# Patient Record
Sex: FEMALE | Race: WHITE | Employment: PART TIME
[De-identification: names, ages, dates, MRNs, and addresses within clinical notes are randomized per-mention and may not be internally consistent; named-entity substitution may affect disease eponyms.]

---
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
## 2018-01-08 ENCOUNTER — APPOINTMENT (OUTPATIENT)
Dept: GENERAL RADIOLOGY | Facility: CLINIC | Age: 18
End: 2018-01-08
Attending: NURSE PRACTITIONER
Payer: COMMERCIAL

## 2018-01-08 ENCOUNTER — HOSPITAL ENCOUNTER (EMERGENCY)
Facility: CLINIC | Age: 18
Discharge: HOME | End: 2018-01-08
Attending: NURSE PRACTITIONER | Admitting: NURSE PRACTITIONER
Payer: COMMERCIAL

## 2018-01-08 VITALS
BODY MASS INDEX: 17.49 KG/M2 | HEIGHT: 65 IN | TEMPERATURE: 98.5 F | SYSTOLIC BLOOD PRESSURE: 121 MMHG | WEIGHT: 105 LBS | DIASTOLIC BLOOD PRESSURE: 71 MMHG | RESPIRATION RATE: 12 BRPM | OXYGEN SATURATION: 100 %

## 2018-01-08 DIAGNOSIS — T14.8XXA MUSCLE STRAIN: ICD-10-CM

## 2018-01-08 DIAGNOSIS — M54.50 ACUTE MIDLINE LOW BACK PAIN WITHOUT SCIATICA: ICD-10-CM
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
## 2018-01-08 PROCEDURE — 72100 X-RAY EXAM L-S SPINE 2/3 VWS: CPT

## 2018-01-08 PROCEDURE — 25000132 ZZH RX MED GY IP 250 OP 250 PS 637: Performed by: NURSE PRACTITIONER

## 2018-01-08 PROCEDURE — 99283 EMERGENCY DEPT VISIT LOW MDM: CPT

## 2018-01-08 RX ORDER — CYCLOBENZAPRINE HCL 10 MG
10 TABLET ORAL ONCE
Status: COMPLETED | OUTPATIENT
Start: 2018-01-08 | End: 2018-01-08

## 2018-01-08 RX ORDER — CYCLOBENZAPRINE HCL 10 MG
10 TABLET ORAL 3 TIMES DAILY PRN
Qty: 21 TABLET | Refills: 0 | Status: SHIPPED | OUTPATIENT
Start: 2018-01-08 | End: 2018-07-27

## 2018-01-08 RX ORDER — ACETAMINOPHEN 500 MG
1000 TABLET ORAL ONCE
Status: COMPLETED | OUTPATIENT
Start: 2018-01-08 | End: 2018-01-08

## 2018-01-08 RX ADMIN — CYCLOBENZAPRINE HYDROCHLORIDE 10 MG: 10 TABLET, FILM COATED ORAL at 14:30

## 2018-01-08 RX ADMIN — ACETAMINOPHEN 1000 MG: 500 TABLET, FILM COATED ORAL at 14:30

## 2018-01-08 ASSESSMENT — ENCOUNTER SYMPTOMS
NUMBNESS: 0
WEAKNESS: 0
BACK PAIN: 1
SHORTNESS OF BREATH: 0

## 2018-01-08 NOTE — ED AVS SNAPSHOT
Emergency Department    [address] [facility] [address]    Phone:  [phone]    Fax:  [phone]                                       Danna Suarez   MRN: [id number]    Department:   Emergency Department   Date of Visit:  1/8/2018           Patient Information     Date Of Birth          2000        Your diagnoses for this visit were:     Fall due to slipping on ice or snow, initial encounter     Acute midline low back pain without sciatica     Muscle strain        You were seen by Izzy Davey APRN CNP.      Follow-up Information     Follow up with Luis A London MD In 5 days.    Specialty:  Internal Medicine    Contact information:    [address] [facility] [address]  [phone]          Discharge Instructions       Take tylenol 1,000 (2 over the counter extra strength Tylenol) mg every 6 hours as needed for pain. Take Flexeril (muscle relaxer) three times a day as needed for pain or muscle spasm, this can be especially helpful at bedtime.         Back Contusion  You have a contusion to your back. A contusion is also called a bruise. There is swelling and some bleeding under the skin. The skin may be purplish. You may have muscle aching and stiffness in the area of the bruise. There are no broken bones.  Contusions heal on their own, without further treatment. However, pain and skin discoloration may take weeks to months to go away.   Home care    Rest. Avoid heavy lifting, strenuous exertion, or any activity that causes pain.    Ice the area to reduce pain and swelling. Put ice cubes in a plastic bag or use a cold pack. (Wrap the cold source in a thin towel. Don't place it directly on your skin.) Ice the injured area for 20 minutes every 1 to 2 hours the first day. Continue with ice packs 3 to 4 times a day for the next 2 days, then as needed for the relief of pain and swelling.    Take any prescribed pain medicine. If none was prescribed, take  acetaminophen, ibuprofen, or naproxen to control pain, unless you have other medical conditions that prevent taking these medicines. If you are unsure about medicines, ask your healthcare provider before you leave the hospital.  Follow-up care  Follow up with your healthcare provider, or as directed. Call if you are not better in 1 to 2 weeks.  When to seek medical advice  Call your healthcare provider for any of the following:    New or worsening pain    Increased swelling around the bruise    Pain spreads to one or both legs    Weakness or numbness in one or both legs     Loss of bowel or bladder control    Numbness in the groin or genital area    Fever of 100.4 F (38 C) or higher, or as directed by your healthcare provider  Date Last Reviewed: 7/1/2017 2000-2017 [employer]. [address] [place] [address]. All rights reserved. This information is not intended as a substitute for professional medical care. Always follow your healthcare professional's instructions.      [facility]  2001 [facility] [phone]   [facility]  [address]  [phone]   [facility]     [address] [phone]     [facility]     [address] [phone]      [facility]    Clinic, 1213 [facility]  [phone]       [facility]     [address] [phone]   [facility]    4730 [facility] [phone]   [facility]     2425 [facility] [phone]     [place] [facility]   [address] [phone]     [facility]     2610 [facility] [phone]   [facility]:    3300 [facility] [phone]   [facility]:    1313 [facility] [phone]   [facility]   [address] [place] [address] [phone]     [facility]  [facility]     860 [facility] [phone]   [facility]     153 [facility] [phone]   [facility]     135 [facility] [phone]   [facility]     409 [facility] [phone]     Family planning and reproductive health centers  Centros de planificación familiar y lj reproductiva    [facility]              [phone]  [facility]               [phone]  [facility] [place]   [phone]  [facility] [place]   [phone]  [facility] [place]  [phone]   [facility] [place]                   [phone]          24 Hour Appointment Hotline       To make an appointment at any [facility], call [phone]). If you don't have a family doctor or clinic, we will help you find one. [facility] are conveniently located to serve the needs of you and your family.             Review of your medicines      START taking        Dose / Directions Last dose taken    cyclobenzaprine 10 MG tablet   Commonly known as:  FLEXERIL   Dose:  10 mg   Quantity:  21 tablet        Take 1 tablet (10 mg) by mouth 3 times daily as needed for muscle spasms   Refills:  0          Our records show that you are taking the medicines listed below. If these are incorrect, please call your family doctor or clinic.        Dose / Directions Last dose taken    EPINEPHrine 0.3 MG/0.3ML injection 2-pack   Commonly known as:  EPIPEN/ADRENACLICK/or ANY BX GENERIC EQUIV   Dose:  0.3 mg        Inject 0.3 mg into the muscle once as needed for anaphylaxis 7/13/15 - Auth'd at [phone] for use in prophylaxis against anaphylactic reaction to NSAIDS.   Refills:  0        mirtazapine 15 MG tablet   Commonly known as:  REMERON   Dose:  15 mg   Quantity:  30  tablet        Take 1 tablet (15 mg) by mouth At Bedtime   Refills:  0        QUEtiapine 25 MG tablet   Commonly known as:  SEROquel   Dose:  12.5 mg   Quantity:  30 tablet        Take 0.5 tablets (12.5 mg) by mouth At Bedtime   Refills:  0                Prescriptions were sent or printed at these locations (1 Prescription)                   Other Prescriptions                Printed at Department/Unit printer (1 of 1)         cyclobenzaprine (FLEXERIL) 10 MG tablet                Procedures and tests performed during your visit     Lumbar spine XR, 2-3 views      Orders Needing Specimen Collection     None      Pending Results     No orders found from 1/6/2018 to 1/9/2018.            Pending Culture Results     No orders found from 1/6/2018 to 1/9/2018.            Pending Results Instructions     If you had any lab results that were not finalized at the time of your Discharge, you can call the ED Lab Result RN at [phone]. You will be contacted by this team for any positive Lab results or changes in treatment. The nurses are available 7 days a week from 10A to 6:30P.  You can leave a message 24 hours per day and they will return your call.        Test Results From Your Hospital Stay        1/8/2018  2:16 PM      Narrative     XR LUMBAR SPINE 2-3 VIEWS 1/8/2018 2:03 PM    HISTORY: Fell on ice. Tenderness.    COMPARISON: None.    FINDINGS: No fracture or malalignment. Vertebral body heights and disc  spaces are preserved.        Impression     IMPRESSION: No acute osseous abnormality.    GIL ALEX MD                Clinical Quality Measure: Blood Pressure Screening     Your blood pressure was checked while you were in the emergency department today. The last reading we obtained was  BP: 121/71 . Please read the guidelines below about what these numbers mean and what you should do about them.  If your systolic blood pressure (the top number) is less than 120 and your diastolic blood pressure (the bottom  "number) is less than 80, then your blood pressure is normal. There is nothing more that you need to do about it.  If your systolic blood pressure (the top number) is 120-139 or your diastolic blood pressure (the bottom number) is 80-89, your blood pressure may be higher than it should be. You should have your blood pressure rechecked within a year by a primary care provider.  If your systolic blood pressure (the top number) is 140 or greater or your diastolic blood pressure (the bottom number) is 90 or greater, you may have high blood pressure. High blood pressure is treatable, but if left untreated over time it can put you at risk for heart attack, stroke, or kidney failure. You should have your blood pressure rechecked by a primary care provider within the next 4 weeks.  If your provider in the emergency department today gave you specific instructions to follow-up with your doctor or provider even sooner than that, you should follow that instruction and not wait for up to 4 weeks for your follow-up visit.        Thank you for choosing [facility]       Thank you for choosing [facility] for your care. Our goal is always to provide you with excellent care. Hearing back from our patients is one way we can continue to improve our services. Please take a few minutes to complete the written survey that you may receive in the mail after you visit with us. Thank you!        [employer] Information     [employer] lets you send messages to your doctor, view your test results, renew your prescriptions, schedule appointments and more. To sign up, go to [email or web address] . Click on \"Log in\" on the left side of the screen, which will take you to the Welcome page. Then click on \"Sign up Now\" on the right side of the page.     You will be asked to enter the access code listed below, as well as some personal information. Please follow the directions to create your username and password.     Your access code is: [email or web address]  Expires: " 2018  2:53 PM     Your access code will  in 90 days. If you need help or a new code, please call your [facility] clinic or [phone].        Care EveryWhere ID     This is your Care EveryWhere ID. This could be used by other organizations to access your [facility] medical records  [id number]        Equal Access to Services     YASMIN BLANKENSHIP : Alireza Jennings, waginna sneed, martha benzaltracie ryan, elias hensley . So [facility] [phone].    ATENCIÓN: Si habla español, tiene a hart disposición servicios gratuitos de asistencia lingüística. Llame al [phone].    We comply with applicable federal civil rights laws and [place] laws. We do not discriminate on the basis of race, color, national origin, age, disability, sex, sexual orientation, or gender identity.            After Visit Summary       This is your record. Keep this with you and show to your community pharmacist(s) and doctor(s) at your next visit.

## 2018-01-08 NOTE — DISCHARGE INSTRUCTIONS
Take tylenol 1,000 (2 over the counter extra strength Tylenol) mg every 6 hours as needed for pain. Take Flexeril (muscle relaxer) three times a day as needed for pain or muscle spasm, this can be especially helpful at bedtime.         Back Contusion  You have a contusion to your back. A contusion is also called a bruise. There is swelling and some bleeding under the skin. The skin may be purplish. You may have muscle aching and stiffness in the area of the bruise. There are no broken bones.  Contusions heal on their own, without further treatment. However, pain and skin discoloration may take weeks to months to go away.   Home care    Rest. Avoid heavy lifting, strenuous exertion, or any activity that causes pain.    Ice the area to reduce pain and swelling. Put ice cubes in a plastic bag or use a cold pack. (Wrap the cold source in a thin towel. Don't place it directly on your skin.) Ice the injured area for 20 minutes every 1 to 2 hours the first day. Continue with ice packs 3 to 4 times a day for the next 2 days, then as needed for the relief of pain and swelling.    Take any prescribed pain medicine. If none was prescribed, take acetaminophen, ibuprofen, or naproxen to control pain, unless you have other medical conditions that prevent taking these medicines. If you are unsure about medicines, ask your healthcare provider before you leave the hospital.  Follow-up care  Follow up with your healthcare provider, or as directed. Call if you are not better in 1 to 2 weeks.  When to seek medical advice  Call your healthcare provider for any of the following:    New or worsening pain    Increased swelling around the bruise    Pain spreads to one or both legs    Weakness or numbness in one or both legs     Loss of bowel or bladder control    Numbness in the groin or genital area    Fever of 100.4 F (38 C) or higher, or as directed by your healthcare provider  Date Last Reviewed: 7/1/2017 2000-2017 [employer]. [address] [place] [address], [place] [address]. All rights reserved. This information is not intended as a substitute for professional medical care. Always follow your healthcare professional's instructions.      [facility]  2001 [facility] [phone]   [facility]  [address]  [phone]   [facility]     [address] [phone]     [facility]     [address] [phone]      [facility]    Clinic, 1213 [facility]  [phone]       [facility]     [address] [phone]   [facility]    4730 [facility] [phone]   [facility]     2425 [facility] [phone]     [place] [facility]   [address] [phone]     [facility]     2610 [facility] [phone]   [facility]:    3300 [facility] [phone]   [facility]:    1313 [facility] [phone]   [facility]   [address] [place] [address] [phone]     [facility]     860 [facility] [phone]   [facility]     153 [facility] [phone]   [facility]     [address] [place] [address] [phone]   [facility]     409 [facility] [phone]     Family planning and reproductive health centers  Centros de planificación familiar y lj reproductiva    [facility]              [phone]  [facility]               [phone]  [facility] [place]   [phone]  [facility] [place]   [phone]  [facility] [place]  [phone]   [facility]                    [phone]

## 2018-01-08 NOTE — ED AVS SNAPSHOT
Emergency Department    [address] [place] [address]    Phone:  [phone]    Fax:  [phone]                                       Danna Suarez   MRN: [id number]    Department:   Emergency Department   Date of Visit:  1/8/2018           After Visit Summary Signature Page     I have received my discharge instructions, and my questions have been answered. I have discussed any challenges I see with this plan with the nurse or doctor.    ..........................................................................................................................................  Patient/Patient Representative Signature      ..........................................................................................................................................  Patient Representative Print Name and Relationship to Patient    ..................................................               ................................................  Date                                            Time    ..........................................................................................................................................  Reviewed by Signature/Title    ...................................................              ..............................................  Date                                                            Time

## 2018-01-08 NOTE — ED PROVIDER NOTES
"  History     Chief Complaint:  Back Pain       HPI   Danna Suarez is a generally healthy 17 year old female who presents to the emergency department for evaluation of traumatic back pain. The patient states that she was walking to the bus stop last night when she slipped on the ice, falling backward, striking her mid to low back on the ground. Since then, she has had continued mid to low back pain back, especially with bending forward, explaining that the pain becomes much more intense with shooting pains in her bilateral upper thighs. She denies striking her head, losing consciousness, or sustaining any other significant injuries as a result of her fall. She denies any recent shortness of breath, chest pain, lower extremity numbness or weakness, or recent bladder or bowel incontinence.     Allergies:  NSAIDs, anaphylaxis     Medications:    Remeron  Seroquel    Past Medical History:    Anxiety  Depression    Past Surgical History:    The patient does not have any pertinent past surgical history.    Family History:    Depression  Substance Abuse  Anxiety    Social History:  Presents with her mother.   Current Every day smoker, 0.50 ppd.    Positive for alcohol use.   Marital Status:  Single [1]    Review of Systems   Respiratory: Negative for shortness of breath.    Cardiovascular: Negative for chest pain.   Musculoskeletal: Positive for back pain.   Neurological: Negative for weakness and numbness.   All other systems reviewed and are negative.    Physical Exam   First Vitals:  BP: 121/71  Heart Rate: 100  Temp: 98.5  F (36.9  C)  Resp: 20  Height: 165.1 cm (5' 5\")  Weight: 47.6 kg (105 lb)  SpO2: 100 %    Physical Exam  Physical Exam   Constitutional: Pt appears well-developed and well-nourished. Non toxic appearing.   Head: Head moves freely with normal range of motion.   ENT: Oropharynx is clear and moist.   Eyes: Conjunctivae pink. EOMs intact.   Neck: Normal range of motion.    Cardiovascular: Regular " rate and rhythm. Normal heart sounds. No concerning murmur. Intact distal pulses: radial pulses 2+ on the right, 2+ on the left. Pedal pulses 2+ on the right, 2+ on the left.   Pulmonary/Chest: No respiratory distress.  Breath sounds normal. No chest wall crepitus.   Abdominal: Soft. Non-tender. No rebound, no guarding. No CVA tenderness.  Musculoskeletal: Distal capillary refill and sensation intact. Lower extremity strength 5/5. Patellar reflexes 2+. Negative straight leg raise and crossover.  No spinal erythema, edema or heat to touch. Bony lumbar spinal tenderness. Paraspinal muscle tenderness in the lumbar region. No step offs or crepitus.   Neurological: Oriented to person, place, and time. No focal deficits. No saddle anesthesia.   Skin: Skin is warm.     Emergency Department Course   Imaging:  Radiographic findings were communicated with the patient who voiced understanding of the findings.    XR Lumbar Spine, 2-3 views:   No acute osseous abnormality. As per radiology.     Interventions:  1430 Tylenol 1000 mg PO   Flexeril 10 mg PO    Emergency Department Course:  Nursing notes and vitals reviewed. 1308 I performed an exam of the patient as documented above.     The patient was sent for a Lumbar Spine XR while in the emergency department, findings above.     1450 I rechecked the patient and discussed the results of her workup thus far.     Findings and plan explained to the Patient. Patient discharged home with instructions regarding supportive care, medications, and reasons to return. The importance of close follow-up was reviewed. The patient was prescribed Flexeril.     Impression & Plan    Medical Decision Making:  This patient presented with back pain after mechanical fall on ice. XR negative for acute pathology, including fracture or dislocation. The patient has not had a fever, saddle/perineal anesthesia, bilateral foot numbness, or bowel or bladder dysfunction.  There is no clinical evidence of  cauda equina syndrome, discitis, spinal/epidural space hematoma or abscess.     The neurological exam is normal and the patient's symptoms are consistent with a musculoskeletal strain and back contusion. There is no current evidence of radiculopathy or myelopathy. We discussed Tylenol (allergy to NSAIDs) and Flexeril prn pain. The patient was told to return for:  increasing pain, numbness, weakness, or bowel or bladder dysfunction.  The patient was advised to schedule follow-up with her primary doctor in 5 days to re-assess symptoms. She and her mother are amenable to plan.     Diagnosis:    ICD-10-CM   1. Fall due to slipping on ice or snow, initial encounter W00.9XXA   2. Acute midline low back pain without sciatica M54.5   3. Muscle strain T14.8XXA       Disposition:  discharged to home    Discharge Medications:  New Prescriptions    CYCLOBENZAPRINE (FLEXERIL) 10 MG TABLET    Take 1 tablet (10 mg) by mouth 3 times daily as needed for muscle spasms     Clare MELÉNDEZ, am serving as a scribe on 1/8/2018 at 1:08 PM to personally document services performed by Izzy Davey NP based on my observations and the provider's statements to me.       Clare Braxton  1/8/2018    EMERGENCY DEPARTMENT       Izzy Davey APRN CNP  01/08/18 2036

## 2018-07-27 ENCOUNTER — HOSPITAL ENCOUNTER (EMERGENCY)
Facility: CLINIC | Age: 18
Discharge: HOME | End: 2018-07-27
Attending: EMERGENCY MEDICINE | Admitting: EMERGENCY MEDICINE
Payer: COMMERCIAL

## 2018-07-27 VITALS
RESPIRATION RATE: 18 BRPM | DIASTOLIC BLOOD PRESSURE: 59 MMHG | HEART RATE: 75 BPM | HEIGHT: 65 IN | TEMPERATURE: 98.1 F | BODY MASS INDEX: 17 KG/M2 | OXYGEN SATURATION: 99 % | WEIGHT: 102 LBS | SYSTOLIC BLOOD PRESSURE: 100 MMHG

## 2018-07-27 DIAGNOSIS — R10.9 FLANK PAIN: ICD-10-CM

## 2018-07-27 LAB
ALBUMIN SERPL-MCNC: 4.2 G/DL (ref 3.4–5)
ALBUMIN UR-MCNC: 10 MG/DL
ALP SERPL-CCNC: 83 U/L (ref 40–150)
ALT SERPL W P-5'-P-CCNC: 15 U/L (ref 0–50)
ANION GAP SERPL CALCULATED.3IONS-SCNC: 6 MMOL/L (ref 3–14)
APPEARANCE UR: CLEAR
AST SERPL W P-5'-P-CCNC: 13 U/L (ref 0–45)
BACTERIA #/AREA URNS HPF: ABNORMAL /HPF
BASOPHILS # BLD AUTO: 0 10E9/L (ref 0–0.2)
BASOPHILS NFR BLD AUTO: 0.4 %
BILIRUB SERPL-MCNC: 0.7 MG/DL (ref 0.2–1.3)
BILIRUB UR QL STRIP: NEGATIVE
BUN SERPL-MCNC: 10 MG/DL (ref 7–30)
CALCIUM SERPL-MCNC: 8.9 MG/DL (ref 8.5–10.1)
CHLORIDE SERPL-SCNC: 105 MMOL/L (ref 94–109)
CO2 SERPL-SCNC: 32 MMOL/L (ref 20–32)
COLOR UR AUTO: YELLOW
CREAT SERPL-MCNC: 0.68 MG/DL (ref 0.52–1.04)
DIFFERENTIAL METHOD BLD: NORMAL
EOSINOPHIL # BLD AUTO: 0.1 10E9/L (ref 0–0.7)
EOSINOPHIL NFR BLD AUTO: 1.7 %
ERYTHROCYTE [DISTWIDTH] IN BLOOD BY AUTOMATED COUNT: 12.6 % (ref 10–15)
GFR SERPL CREATININE-BSD FRML MDRD: >90 ML/MIN/1.7M2
GLUCOSE SERPL-MCNC: 70 MG/DL (ref 70–99)
GLUCOSE UR STRIP-MCNC: NEGATIVE MG/DL
HCG UR QL: NEGATIVE
HCT VFR BLD AUTO: 40.4 % (ref 35–47)
HGB BLD-MCNC: 14 G/DL (ref 11.7–15.7)
HGB UR QL STRIP: NEGATIVE
IMM GRANULOCYTES # BLD: 0 10E9/L (ref 0–0.4)
IMM GRANULOCYTES NFR BLD: 0.2 %
KETONES UR STRIP-MCNC: NEGATIVE MG/DL
LEUKOCYTE ESTERASE UR QL STRIP: ABNORMAL
LIPASE SERPL-CCNC: 117 U/L (ref 73–393)
LYMPHOCYTES # BLD AUTO: 1.9 10E9/L (ref 0.8–5.3)
LYMPHOCYTES NFR BLD AUTO: 23.2 %
MCH RBC QN AUTO: 30.6 PG (ref 26.5–33)
MCHC RBC AUTO-ENTMCNC: 34.7 G/DL (ref 31.5–36.5)
MCV RBC AUTO: 88 FL (ref 78–100)
MONOCYTES # BLD AUTO: 0.5 10E9/L (ref 0–1.3)
MONOCYTES NFR BLD AUTO: 6 %
MUCOUS THREADS #/AREA URNS LPF: PRESENT /LPF
NEUTROPHILS # BLD AUTO: 5.6 10E9/L (ref 1.6–8.3)
NEUTROPHILS NFR BLD AUTO: 68.5 %
NITRATE UR QL: NEGATIVE
NRBC # BLD AUTO: 0 10*3/UL
NRBC BLD AUTO-RTO: 0 /100
PH UR STRIP: 7 PH (ref 5–7)
PLATELET # BLD AUTO: 280 10E9/L (ref 150–450)
POTASSIUM SERPL-SCNC: 3.3 MMOL/L (ref 3.4–5.3)
PROT SERPL-MCNC: 8.4 G/DL (ref 6.8–8.8)
RBC # BLD AUTO: 4.58 10E12/L (ref 3.8–5.2)
RBC #/AREA URNS AUTO: 1 /HPF (ref 0–2)
SODIUM SERPL-SCNC: 143 MMOL/L (ref 133–144)
SOURCE: ABNORMAL
SP GR UR STRIP: 1.02 (ref 1–1.03)
SQUAMOUS #/AREA URNS AUTO: 1 /HPF (ref 0–1)
UROBILINOGEN UR STRIP-MCNC: NORMAL MG/DL (ref 0–2)
WBC # BLD AUTO: 8.1 10E9/L (ref 4–11)
WBC #/AREA URNS AUTO: 5 /HPF (ref 0–5)

## 2018-07-27 PROCEDURE — 85025 COMPLETE CBC W/AUTO DIFF WBC: CPT | Performed by: EMERGENCY MEDICINE

## 2018-07-27 PROCEDURE — 80053 COMPREHEN METABOLIC PANEL: CPT | Performed by: EMERGENCY MEDICINE

## 2018-07-27 PROCEDURE — 81025 URINE PREGNANCY TEST: CPT | Performed by: EMERGENCY MEDICINE

## 2018-07-27 PROCEDURE — 99283 EMERGENCY DEPT VISIT LOW MDM: CPT

## 2018-07-27 PROCEDURE — 87086 URINE CULTURE/COLONY COUNT: CPT | Performed by: EMERGENCY MEDICINE

## 2018-07-27 PROCEDURE — 81001 URINALYSIS AUTO W/SCOPE: CPT | Performed by: EMERGENCY MEDICINE

## 2018-07-27 PROCEDURE — 83690 ASSAY OF LIPASE: CPT | Performed by: EMERGENCY MEDICINE

## 2018-07-27 ASSESSMENT — ENCOUNTER SYMPTOMS
BLOOD IN STOOL: 0
DIZZINESS: 1
UNEXPECTED WEIGHT CHANGE: 1
ABDOMINAL PAIN: 1
BACK PAIN: 1
NAUSEA: 1
DIARRHEA: 1
VOMITING: 0
ABDOMINAL DISTENTION: 1

## 2018-07-27 NOTE — ED AVS SNAPSHOT
Emergency Department    [address] [facility] [address]    Phone:  [phone]    Fax:  [phone]                                       Danna Suarez   MRN: [id number]    Department:   Emergency Department   Date of Visit:  7/27/2018           Patient Information     Date Of Birth          2000        Your diagnoses for this visit were:     Flank pain        You were seen by Fredis Cox MD.      Follow-up Information     Follow up with Clinic, [facility] In 3 days.    Contact information:    [address] [facility] [address]  [phone]          Follow up with Brandon Ledezma MD. Call in 3 days.    Specialty:  Gastroenterology    Contact information:    [facility]  [address] [facility] [address]  [phone]          Discharge Instructions         Flank Pain, Uncertain Cause  The flank is the area between your upper abdomen and your back. Pain there is often caused by a problem with your kidneys. It might be a kidney infection or a kidney stone. Other causes of flank pain include spinal arthritis, a pinched nerve from a back injury, or a back muscle strain or spasm.  The cause of your flank pain is not certain. You may need other tests.  Home care  Follow these tips when caring for yourself at home:    You may use acetaminophen or ibuprofen to control pain, unless your health care provider prescribed another medicine. If you have chronic liver or kidney disease, talk with your provider before taking these medicines. Also talk with your provider first if you ve ever had a stomach ulcer or GI bleeding.    If the pain is coming from your muscles, you may get relief with ice or heat. During the first 2 days after the injury, put an ice pack on the painful area for 20 minutes every 2 to 4 hours. This will reduce swelling and pain. A hot shower, hot bath, or heating pad works well for a muscle spasm. You can start  with ice, then switch to heat after 2 days. You might find that alternating ice and heat works well. Use the method that feels the best to you.  Follow-up care  Follow up with your healthcare provider if your symptoms don t get better over the next few days.  When to seek medical advice  Call your healthcare provider right away if any of these happen:    Repeated vomiting    Fever of 100.4 F (38 C) or higher, or as directed by your health care provider    Flank pain that gets worse    Pain that spreads to the front of your belly (abdomen)    Dizziness, weakness, or fainting    Blood in your urine    Burning feeling when you urinate or the need to urinate often    Pain in one of your legs that gets worse    Numbness or weakness in a leg  Date Last Reviewed: 10/1/2016    1455-7077 [employer]. [address] [place] [address]. All rights reserved. This information is not intended as a substitute for professional medical care. Always follow your healthcare professional's instructions.          24 Hour Appointment Hotline       To make an appointment at any [facility], call [phone] ([phone]). If you don't have a family doctor or clinic, we will help you find one. [facility] clinics are conveniently located to serve the needs of you and your family.             Review of your medicines      Our records show that you are taking the medicines listed below. If these are incorrect, please call your family doctor or clinic.        Dose / Directions Last dose taken    EPINEPHrine 0.3 MG/0.3ML injection 2-pack   Commonly known as:  EPIPEN/ADRENACLICK/or ANY BX GENERIC EQUIV   Dose:  0.3 mg        Inject 0.3 mg into the muscle once as needed for anaphylaxis 7/13/15 - Auth'd at [id number] for use in prophylaxis against anaphylactic reaction to NSAIDS.   Refills:  0                Procedures and tests performed during your visit     CBC with platelets differential    Comprehensive  metabolic panel    HCG qualitative urine    Lipase    UA with Microscopic reflex to Culture      Orders Needing Specimen Collection     None      Pending Results     No orders found from 7/25/2018 to 7/28/2018.            Pending Culture Results     No orders found from 7/25/2018 to 7/28/2018.            Pending Results Instructions     If you had any lab results that were not finalized at the time of your Discharge, you can call the ED Lab Result RN at [phone]. You will be contacted by this team for any positive Lab results or changes in treatment. The nurses are available 7 days a week from 10A to 6:30P.  You can leave a message 24 hours per day and they will return your call.        Test Results From Your Hospital Stay        7/27/2018  7:45 PM      Component Results     Component Value Ref Range & Units Status    WBC 8.1 4.0 - 11.0 10e9/L Final    RBC Count 4.58 3.8 - 5.2 10e12/L Final    Hemoglobin 14.0 11.7 - 15.7 g/dL Final    Hematocrit 40.4 35.0 - 47.0 % Final    MCV 88 78 - 100 fl Final    MCH 30.6 26.5 - 33.0 pg Final    MCHC 34.7 31.5 - 36.5 g/dL Final    RDW 12.6 10.0 - 15.0 % Final    Platelet Count 280 150 - 450 10e9/L Final    Diff Method Automated Method  Final    % Neutrophils 68.5 % Final    % Lymphocytes 23.2 % Final    % Monocytes 6.0 % Final    % Eosinophils 1.7 % Final    % Basophils 0.4 % Final    % Immature Granulocytes 0.2 % Final    Nucleated RBCs 0 0 /100 Final    Absolute Neutrophil 5.6 1.6 - 8.3 10e9/L Final    Absolute Lymphocytes 1.9 0.8 - 5.3 10e9/L Final    Absolute Monocytes 0.5 0.0 - 1.3 10e9/L Final    Absolute Eosinophils 0.1 0.0 - 0.7 10e9/L Final    Absolute Basophils 0.0 0.0 - 0.2 10e9/L Final    Abs Immature Granulocytes 0.0 0 - 0.4 10e9/L Final    Absolute Nucleated RBC 0.0  Final         7/27/2018  8:00 PM      Component Results     Component Value Ref Range & Units Status    Sodium 143 133 - 144 mmol/L Final    Potassium 3.3 (L) 3.4 - 5.3 mmol/L Final    Chloride 105  94 - 109 mmol/L Final    Carbon Dioxide 32 20 - 32 mmol/L Final    Anion Gap 6 3 - 14 mmol/L Final    Glucose 70 70 - 99 mg/dL Final    Urea Nitrogen 10 7 - 30 mg/dL Final    Creatinine 0.68 0.52 - 1.04 mg/dL Final    GFR Estimate >90 >60 mL/min/1.7m2 Final    Non  GFR Calc    GFR Estimate If Black >90 >60 mL/min/1.7m2 Final    African American GFR Calc    Calcium 8.9 8.5 - 10.1 mg/dL Final    Bilirubin Total 0.7 0.2 - 1.3 mg/dL Final    Albumin 4.2 3.4 - 5.0 g/dL Final    Protein Total 8.4 6.8 - 8.8 g/dL Final    Alkaline Phosphatase 83 40 - 150 U/L Final    ALT 15 0 - 50 U/L Final    AST 13 0 - 45 U/L Final         7/27/2018  7:58 PM      Component Results     Component Value Ref Range & Units Status    Lipase 117 73 - 393 U/L Final         7/27/2018  8:18 PM      Component Results     Component Value Ref Range & Units Status    Color Urine Yellow  Final    Appearance Urine Clear  Final    Glucose Urine Negative NEG^Negative mg/dL Final    Bilirubin Urine Negative NEG^Negative Final    Ketones Urine Negative NEG^Negative mg/dL Final    Specific Gravity Urine 1.017 1.003 - 1.035 Final    Blood Urine Negative NEG^Negative Final    pH Urine 7.0 5.0 - 7.0 pH Final    Protein Albumin Urine 10 (A) NEG^Negative mg/dL Final    Urobilinogen mg/dL Normal 0.0 - 2.0 mg/dL Final    Nitrite Urine Negative NEG^Negative Final    Leukocyte Esterase Urine Trace (A) NEG^Negative Final    Source Midstream Urine  Final    WBC Urine 5 0 - 5 /HPF Final    RBC Urine 1 0 - 2 /HPF Final    Bacteria Urine Moderate (A) NEG^Negative /HPF Final    Squamous Epithelial /HPF Urine 1 0 - 1 /HPF Final    Mucous Urine Present (A) NEG^Negative /LPF Final         7/27/2018  8:18 PM      Component Results     Component Value Ref Range & Units Status    HCG Qual Urine Negative NEG^Negative Final    This test is for screening purposes.  Results should be interpreted along with   the clinical picture.  Confirmation testing is available if  warranted by   ordering [id number], HCG Quantitative Pregnancy.                  Clinical Quality Measure: Blood Pressure Screening     Your blood pressure was checked while you were in the emergency department today. The last reading we obtained was  BP: 109/69 . Please read the guidelines below about what these numbers mean and what you should do about them.  If your systolic blood pressure (the top number) is less than 120 and your diastolic blood pressure (the bottom number) is less than 80, then your blood pressure is normal. There is nothing more that you need to do about it.  If your systolic blood pressure (the top number) is 120-139 or your diastolic blood pressure (the bottom number) is 80-89, your blood pressure may be higher than it should be. You should have your blood pressure rechecked within a year by a primary care provider.  If your systolic blood pressure (the top number) is 140 or greater or your diastolic blood pressure (the bottom number) is 90 or greater, you may have high blood pressure. High blood pressure is treatable, but if left untreated over time it can put you at risk for heart attack, stroke, or kidney failure. You should have your blood pressure rechecked by a primary care provider within the next 4 weeks.  If your provider in the emergency department today gave you specific instructions to follow-up with your doctor or provider even sooner than that, you should follow that instruction and not wait for up to 4 weeks for your follow-up visit.        Thank you for choosing [facility]       Thank you for choosing [facility] for your care. Our goal is always to provide you with excellent care. Hearing back from our patients is one way we can continue to improve our services. Please take a few minutes to complete the written survey that you may receive in the mail after you visit with us. Thank you!        [employer] Information     [facility] lets you send messages to your doctor, view your test  "results, renew your prescriptions, schedule appointments and more. To sign up, go to [email or web address] . Click on \"Log in\" on the left side of the screen, which will take you to the Welcome page. Then click on \"Sign up Now\" on the right side of the page.     You will be asked to enter the access code listed below, as well as some personal information. Please follow the directions to create your username and password.     Your access code is: [email or web address]  Expires: 10/25/2018  8:33 PM     Your access code will  in 90 days. If you need help or a new code, please call your [facility] clinic or [phone].        Care EveryWhere ID     This is your Care EveryWhere ID. This could be used by other organizations to access your [facility] medical records  [id number]        Equal Access to Services     YASMIN BLANKENSHIP : Hadmonique Jennings, betty sneed, martha benzalmatimmy ryan, elias hensley . So [facility] [phone].    ATENCIÓN: Si habla español, tiene a hart disposición servicios gratuitos de asistencia lingüística. Llame al [phone].    We comply with applicable federal civil rights laws and [place] laws. We do not discriminate on the basis of race, color, national origin, age, disability, sex, sexual orientation, or gender identity.            After Visit Summary       This is your record. Keep this with you and show to your community pharmacist(s) and doctor(s) at your next visit.                  "

## 2018-07-27 NOTE — ED PROVIDER NOTES
History     Chief Complaint:  Abdominal Pain    HPI   Danna Suarez is a 18 year old female with a history of anxiety and depression who presents with abdominal pain. The patient reports that she has had sporadic and intermittent stabbing pain radiating from her right upper quadrant into her back and right shoulder for over a year. She states that this pain is aggravated by breathing deeply, drinking alcohol, vaping and having an empty stomach. She notes she visited her primary 2 days ago and had an ultrasound, but is awaiting the results. She reports she was told she most likely has a problem with her liver or gallbladder and to refer to the ED if the pain becomes constant. She states that since her visit the pain has progressed, prompting her to seem evaluation in the ED. She additionally reports having nausea, dizziness, fainting spells, bloating, diarrhea and unexpected weight loss. She denies having vomiting or blood in stool. Of note, the patient reports that 1 year she overdosed on NSAIDs and since the overdose has developed this abdomina    7/25/2018 [facility] US  Result Impression   IMPRESSION: Unremarkable right upper quadrant ultrasound.    Result Narrative   COMPARISON:  None.  FINDINGS:    Pancreas: Appears unremarkable.  Liver: Contour appears unremarkable. Parenchyma appears unremarkable  Gallbladder: Gallbladder is unremarkable without cholelithiasis, gallbladder distention, wall thickening or pericholecystic fluid.  Sonographic Muñoz's Sign: No.  CBD: 0.3 cm.  Right Kidney: Measures 9.4 x 3.2 x 5.0 cm. Appears unremarkable.  Ascites: None.     Allergies:  NSAIDs  Advil PM    Medications:    Epipen    Past Medical History:    Anxiety  Depression  Mono exposure  Suicidal ideation    Past Surgical History:    History reviewed. No pertinent surgical history.    Family History:    Depression  Substance abuse  Anxiety disorder    Social History:  Marital Status:  Single [1]  Smoking status:  "Current every day smoker  Alcohol use: Yes    Review of Systems   Constitutional: Positive for unexpected weight change (weightloss ).   Gastrointestinal: Positive for abdominal distention, abdominal pain (URQ), diarrhea and nausea. Negative for blood in stool and vomiting.   Musculoskeletal: Positive for back pain.   Neurological: Positive for dizziness and syncope.     Physical Exam   Physical Exam    Patient Vitals for the past 24 hrs:   BP Temp Temp src Pulse Resp SpO2 Height Weight   07/27/18 2042 100/59 - - 75 - 99 % - -   07/27/18 1733 109/69 98.1  F (36.7  C) Oral 84 18 98 % 1.651 m (5' 5\") 46.3 kg (102 lb)     General: Alert and Interactive.   Head: No signs of trauma.   Mouth/Throat: Oropharynx is clear and moist.   Eyes: Conjunctivae are normal. Pupils are equal, round, and reactive to light.   Neck: Normal range of motion. No nuchal rigidity.   CV: Normal rate and regular rhythm.    Resp: Effort normal and breath sounds normal. No respiratory distress.   GI: Soft. Right flank pain o/w no anterior abdominal pain  MSK: Normal range of motion. no edema.   Neuro: The patient is alert and oriented to person, place, and time.  PERRLA, EOMI, strength in upper/lower extremities normal and symmetrical.   Sensation normal. Speech normal.  GCS eye subscore is 4. GCS verbal subscore is 5. GCS motor subscore is 6.   Skin: Skin is warm and dry. No rash noted.   Psych: normal mood and affect. behavior is normal.     Emergency Department Course   Imaging:  None in the ED, today (see recent RUQ US above)    Laboratory:  CBC: WNL (WBC 8.1, HGB 14.0, )  CMP: Potassium 3.3 (L), o/w WNL (Creatinine 0.68)  Lipase: 117  UA: Protein Albumin 10, Leukocyte Esterase-- Trace, Bacteria-- Moderate, Mucous Present, o/w Negative.  HCG Qualitative: Negative    Urine Cultures: in process    Emergency Department Course:  Past medical records, nursing notes, and vitals reviewed.  1843: I performed an exam of the patient and obtained " history, as documented above.   IV inserted and blood drawn.    1948: I rechecked the patient. Explained findings to the patient.    Findings and plan explained to the patient. Patient discharged home with instructions regarding supportive care, medications, and reasons to return. The importance of close follow-up was reviewed.     Impression & Plan    Medical Decision Making:  Danna Suarez is a 18 year old female who is presenting with right-sided flank pain that she has experienced for over 1 year. This pain is mostly in the flank area near the kidney. Concern is for possible kidney stone, but she has no blood in her urine. Her abdominal exam is benign. Laboratory studies reveal possible UTI, but this also may be contaminate. She denies dysuria. We will await urine culture before treating. She has recently been treated for UTI. Patient's white count is normal. No concerns for an intraabdominal infection or pyelonephritis. We will have the patient closely follow up with primary care if symptoms continue.      Diagnosis:    ICD-10-CM   1. Flank pain R10.9       Disposition:  discharged to home        Haylee Hwang  7/27/2018    EMERGENCY DEPARTMENT  I, Haylee Hwang, am serving as a scribe at 6:43 PM on 7/27/2018 to document services personally performed by Fredis Cox MD based on my observations and the provider's statements to me.        Fredis Cox MD  07/28/18 0010

## 2018-07-27 NOTE — ED AVS SNAPSHOT
Emergency Department    [address] [place] [address]    Phone:  [phone]    Fax:  [phone]                                       Danna Suarez   MRN: [id number]    Department:   Emergency Department   Date of Visit:  7/27/2018           After Visit Summary Signature Page     I have received my discharge instructions, and my questions have been answered. I have discussed any challenges I see with this plan with the nurse or doctor.    ..........................................................................................................................................  Patient/Patient Representative Signature      ..........................................................................................................................................  Patient Representative Print Name and Relationship to Patient    ..................................................               ................................................  Date                                            Time    ..........................................................................................................................................  Reviewed by Signature/Title    ...................................................              ..............................................  Date                                                            Time

## 2018-07-28 LAB
BACTERIA SPEC CULT: NO GROWTH
Lab: NORMAL
SPECIMEN SOURCE: NORMAL

## 2018-07-28 NOTE — DISCHARGE INSTRUCTIONS
Flank Pain, Uncertain Cause  The flank is the area between your upper abdomen and your back. Pain there is often caused by a problem with your kidneys. It might be a kidney infection or a kidney stone. Other causes of flank pain include spinal arthritis, a pinched nerve from a back injury, or a back muscle strain or spasm.  The cause of your flank pain is not certain. You may need other tests.  Home care  Follow these tips when caring for yourself at home:    You may use acetaminophen or ibuprofen to control pain, unless your health care provider prescribed another medicine. If you have chronic liver or kidney disease, talk with your provider before taking these medicines. Also talk with your provider first if you ve ever had a stomach ulcer or GI bleeding.    If the pain is coming from your muscles, you may get relief with ice or heat. During the first 2 days after the injury, put an ice pack on the painful area for 20 minutes every 2 to 4 hours. This will reduce swelling and pain. A hot shower, hot bath, or heating pad works well for a muscle spasm. You can start with ice, then switch to heat after 2 days. You might find that alternating ice and heat works well. Use the method that feels the best to you.  Follow-up care  Follow up with your healthcare provider if your symptoms don t get better over the next few days.  When to seek medical advice  Call your healthcare provider right away if any of these happen:    Repeated vomiting    Fever of 100.4 F (38 C) or higher, or as directed by your health care provider    Flank pain that gets worse    Pain that spreads to the front of your belly (abdomen)    Dizziness, weakness, or fainting    Blood in your urine    Burning feeling when you urinate or the need to urinate often    Pain in one of your legs that gets worse    Numbness or weakness in a leg  Date Last Reviewed: 10/1/2016    1844-7103 [employer]. [address] [facility], [place] [address]. All  rights reserved. This information is not intended as a substitute for professional medical care. Always follow your healthcare professional's instructions.

## 2018-07-30 ENCOUNTER — HOSPITAL ENCOUNTER (EMERGENCY)
Facility: CLINIC | Age: 18
Discharge: HOME | End: 2018-07-30
Attending: EMERGENCY MEDICINE | Admitting: EMERGENCY MEDICINE
Payer: COMMERCIAL

## 2018-07-30 ENCOUNTER — APPOINTMENT (OUTPATIENT)
Dept: CT IMAGING | Facility: CLINIC | Age: 18
End: 2018-07-30
Attending: EMERGENCY MEDICINE
Payer: COMMERCIAL

## 2018-07-30 VITALS
TEMPERATURE: 98.6 F | BODY MASS INDEX: 17.66 KG/M2 | SYSTOLIC BLOOD PRESSURE: 99 MMHG | WEIGHT: 106 LBS | RESPIRATION RATE: 14 BRPM | OXYGEN SATURATION: 97 % | DIASTOLIC BLOOD PRESSURE: 69 MMHG | HEART RATE: 68 BPM | HEIGHT: 65 IN

## 2018-07-30 DIAGNOSIS — R10.13 ABDOMINAL PAIN, EPIGASTRIC: Primary | ICD-10-CM

## 2018-07-30 LAB
ALBUMIN SERPL-MCNC: 4.1 G/DL (ref 3.4–5)
ALBUMIN UR-MCNC: NEGATIVE MG/DL
ALP SERPL-CCNC: 78 U/L (ref 40–150)
ALT SERPL W P-5'-P-CCNC: 14 U/L (ref 0–50)
ANION GAP SERPL CALCULATED.3IONS-SCNC: 8 MMOL/L (ref 3–14)
APPEARANCE UR: CLEAR
AST SERPL W P-5'-P-CCNC: 15 U/L (ref 0–45)
BACTERIA #/AREA URNS HPF: ABNORMAL /HPF
BASOPHILS # BLD AUTO: 0 10E9/L (ref 0–0.2)
BASOPHILS NFR BLD AUTO: 0.4 %
BILIRUB SERPL-MCNC: 0.5 MG/DL (ref 0.2–1.3)
BILIRUB UR QL STRIP: NEGATIVE
BUN SERPL-MCNC: 8 MG/DL (ref 7–30)
CALCIUM SERPL-MCNC: 8.7 MG/DL (ref 8.5–10.1)
CHLORIDE SERPL-SCNC: 105 MMOL/L (ref 94–109)
CO2 SERPL-SCNC: 28 MMOL/L (ref 20–32)
COLOR UR AUTO: ABNORMAL
CREAT SERPL-MCNC: 0.64 MG/DL (ref 0.52–1.04)
DIFFERENTIAL METHOD BLD: NORMAL
EOSINOPHIL # BLD AUTO: 0.2 10E9/L (ref 0–0.7)
EOSINOPHIL NFR BLD AUTO: 2.5 %
ERYTHROCYTE [DISTWIDTH] IN BLOOD BY AUTOMATED COUNT: 12.6 % (ref 10–15)
GFR SERPL CREATININE-BSD FRML MDRD: >90 ML/MIN/1.7M2
GLUCOSE SERPL-MCNC: 89 MG/DL (ref 70–99)
GLUCOSE UR STRIP-MCNC: NEGATIVE MG/DL
HCG UR QL: NEGATIVE
HCT VFR BLD AUTO: 37.5 % (ref 35–47)
HGB BLD-MCNC: 12.9 G/DL (ref 11.7–15.7)
HGB UR QL STRIP: NEGATIVE
IMM GRANULOCYTES # BLD: 0 10E9/L (ref 0–0.4)
IMM GRANULOCYTES NFR BLD: 0.1 %
KETONES UR STRIP-MCNC: NEGATIVE MG/DL
LEUKOCYTE ESTERASE UR QL STRIP: NEGATIVE
LYMPHOCYTES # BLD AUTO: 2.7 10E9/L (ref 0.8–5.3)
LYMPHOCYTES NFR BLD AUTO: 35 %
MCH RBC QN AUTO: 30.2 PG (ref 26.5–33)
MCHC RBC AUTO-ENTMCNC: 34.4 G/DL (ref 31.5–36.5)
MCV RBC AUTO: 88 FL (ref 78–100)
MONOCYTES # BLD AUTO: 0.5 10E9/L (ref 0–1.3)
MONOCYTES NFR BLD AUTO: 6.5 %
NEUTROPHILS # BLD AUTO: 4.3 10E9/L (ref 1.6–8.3)
NEUTROPHILS NFR BLD AUTO: 55.5 %
NITRATE UR QL: NEGATIVE
NRBC # BLD AUTO: 0 10*3/UL
NRBC BLD AUTO-RTO: 0 /100
PH UR STRIP: 7 PH (ref 5–7)
PLATELET # BLD AUTO: 261 10E9/L (ref 150–450)
POTASSIUM SERPL-SCNC: 3.4 MMOL/L (ref 3.4–5.3)
PROT SERPL-MCNC: 7.5 G/DL (ref 6.8–8.8)
RBC # BLD AUTO: 4.27 10E12/L (ref 3.8–5.2)
RBC #/AREA URNS AUTO: <1 /HPF (ref 0–2)
SODIUM SERPL-SCNC: 141 MMOL/L (ref 133–144)
SOURCE: ABNORMAL
SP GR UR STRIP: 1 (ref 1–1.03)
SQUAMOUS #/AREA URNS AUTO: <1 /HPF (ref 0–1)
UROBILINOGEN UR STRIP-MCNC: NORMAL MG/DL (ref 0–2)
WBC # BLD AUTO: 7.7 10E9/L (ref 4–11)
WBC #/AREA URNS AUTO: 3 /HPF (ref 0–5)

## 2018-07-30 PROCEDURE — 74177 CT ABD & PELVIS W/CONTRAST: CPT

## 2018-07-30 PROCEDURE — 99285 EMERGENCY DEPT VISIT HI MDM: CPT | Mod: 25

## 2018-07-30 PROCEDURE — 96375 TX/PRO/DX INJ NEW DRUG ADDON: CPT

## 2018-07-30 PROCEDURE — 25000125 ZZHC RX 250: Performed by: EMERGENCY MEDICINE

## 2018-07-30 PROCEDURE — 25000128 H RX IP 250 OP 636: Performed by: EMERGENCY MEDICINE

## 2018-07-30 PROCEDURE — 81001 URINALYSIS AUTO W/SCOPE: CPT | Performed by: EMERGENCY MEDICINE

## 2018-07-30 PROCEDURE — 96374 THER/PROPH/DIAG INJ IV PUSH: CPT

## 2018-07-30 PROCEDURE — 80053 COMPREHEN METABOLIC PANEL: CPT | Performed by: EMERGENCY MEDICINE

## 2018-07-30 PROCEDURE — 85025 COMPLETE CBC W/AUTO DIFF WBC: CPT | Performed by: EMERGENCY MEDICINE

## 2018-07-30 PROCEDURE — 96361 HYDRATE IV INFUSION ADD-ON: CPT

## 2018-07-30 PROCEDURE — 81025 URINE PREGNANCY TEST: CPT | Performed by: EMERGENCY MEDICINE

## 2018-07-30 RX ORDER — IOPAMIDOL 755 MG/ML
53 INJECTION, SOLUTION INTRAVASCULAR ONCE
Status: COMPLETED | OUTPATIENT
Start: 2018-07-30 | End: 2018-07-30

## 2018-07-30 RX ORDER — ONDANSETRON 2 MG/ML
4 INJECTION INTRAMUSCULAR; INTRAVENOUS ONCE
Status: COMPLETED | OUTPATIENT
Start: 2018-07-30 | End: 2018-07-30

## 2018-07-30 RX ORDER — ONDANSETRON 4 MG/1
4 TABLET, ORALLY DISINTEGRATING ORAL EVERY 8 HOURS PRN
Qty: 10 TABLET | Refills: 0 | Status: SHIPPED | OUTPATIENT
Start: 2018-07-30 | End: 2019-02-25

## 2018-07-30 RX ORDER — SUCRALFATE ORAL 1 G/10ML
1 SUSPENSION ORAL 4 TIMES DAILY
Qty: 420 ML | Refills: 1 | Status: SHIPPED | OUTPATIENT
Start: 2018-07-30 | End: 2019-02-25

## 2018-07-30 RX ADMIN — ONDANSETRON 4 MG: 2 INJECTION INTRAMUSCULAR; INTRAVENOUS at 03:58

## 2018-07-30 RX ADMIN — IOPAMIDOL 53 ML: 755 INJECTION, SOLUTION INTRAVENOUS at 04:40

## 2018-07-30 RX ADMIN — SODIUM CHLORIDE, PRESERVATIVE FREE 60 ML: 5 INJECTION INTRAVENOUS at 04:40

## 2018-07-30 RX ADMIN — SODIUM CHLORIDE 1000 ML: 9 INJECTION, SOLUTION INTRAVENOUS at 03:57

## 2018-07-30 RX ADMIN — FAMOTIDINE 20 MG: 10 INJECTION INTRAVENOUS at 03:58

## 2018-07-30 ASSESSMENT — ENCOUNTER SYMPTOMS
ABDOMINAL PAIN: 1
FREQUENCY: 1
APPETITE CHANGE: 1
DYSURIA: 0

## 2018-07-30 NOTE — ED AVS SNAPSHOT
Emergency Department    [address] [place] [address]    Phone:  [phone]    Fax:  [phone]                                       Danna Suarez   MRN: [id number]    Department:   Emergency Department   Date of Visit:  7/30/2018           After Visit Summary Signature Page     I have received my discharge instructions, and my questions have been answered. I have discussed any challenges I see with this plan with the nurse or doctor.    ..........................................................................................................................................  Patient/Patient Representative Signature      ..........................................................................................................................................  Patient Representative Print Name and Relationship to Patient    ..................................................               ................................................  Date                                            Time    ..........................................................................................................................................  Reviewed by Signature/Title    ...................................................              ..............................................  Date                                                            Time

## 2018-07-30 NOTE — ED PROVIDER NOTES
"  History     Chief Complaint:  Abdominal Pain       HPI   Danna Suarez is a 18 year old female who presents to the emergency department today for evaluation of abdominal pain. Per chart review, the patient was seen and evaluated in clinic 10 days ago for 2 year history of RUQ abdominal pain. She obtained a normal gallbladder US at that time. She then presented to the ED 3 days ago for persistent pain and was discharged to home after workup returned normal. She returns tonight with worsening pain over the last 3 days accompanied with nausea and anorexia. She denies dysuria but complains of urinary frequency. No history of abdominal surgeries. No history of kidney stones. She has Lara IUD in place. No abnormal vaginal discharge. No other concerns voiced at this time.     Allergies:  NSAIDs  Advil PM     Medications:    Epipen  Lara IUD     Past Medical History:    Anxiety  Depression  Mono exposure  Suicidal ideation     Past Surgical History:    History reviewed. No pertinent surgical history.     Family History:    Depression  Substance abuse  Anxiety disorder    Social History:  The patient was accompanied to the ED by self.  Smoking Status: Current every day smoker  Smokeless Tobacco: Current user  Alcohol Use: Yes  Marital Status:  Single [1]     Review of Systems   Constitutional: Positive for appetite change.   Gastrointestinal: Positive for abdominal pain.   Genitourinary: Positive for frequency. Negative for dysuria and vaginal discharge.   All other systems reviewed and are negative.    Physical Exam   First Vitals:  BP: 119/67  Pulse: 69  Heart Rate: 84  Temp: 98.6  F (37  C)  Resp: 16  Height: 165.1 cm (5' 5\")  Weight: 48.1 kg (106 lb)  SpO2: 97 %    Physical Exam  .General: Alert, appears well-developed and well-nourished. Cooperative.     In mild distress  HEENT:  Head:  Atraumatic  Ears:  External ears are normal  Mouth/Throat:  Oropharynx is without erythema or exudate and mucous membranes are " moist.   Eyes:   Conjunctivae normal and EOM are normal. No scleral icterus.  CV:  Normal rate, regular rhythm, normal heart sounds and radial pulses are 2+ and symmetric.  No murmur.  Resp:  Breath sounds are clear bilaterally    Non-labored, no retractions or accessory muscle use  GI:  Abdomen is soft, no distension, epigastric and RLQ tenderness. No rebound or guarding.  No CVA tenderness bilaterally  MS:  Normal range of motion. No edema.    Normal strength in all 4 extremities.     Back atraumatic.    No midline cervical, thoracic, or lumbar tenderness  Skin:  Warm and dry.  No rash or lesions noted.  Neuro: Alert. Normal strength.GCS: 15  Psych:  Normal mood and affect.    Emergency Department Course   Imaging:  Radiology findings were communicated with the patient who voiced understanding of the findings.    CT Abdomen Pelvis w/ Contrast:  1. No acute abnormality. No appendicitis or other bowel inflammation  or obstruction.  2. IUD in place.  Reading per radiology    Laboratory:  Laboratory findings were communicated with the patient who voiced understanding of the findings.    UA: Bacteria: Few    HCG qual: Negative     CMP: AWNL (Creatinine: 0.64)    CBC: AWNL. (WBC 7.7, HGB 12.9, )     Interventions:  0357- NS 1000 mL IV   0358- Pepcid 20 mg IV  0358- Zofran 4 mg IV      Emergency Department Course:  Nursing notes and vitals reviewed.  I performed an exam of the patient as documented above.     IV was inserted and blood was drawn for laboratory testing, results above.    The patient provided a urine sample here in the emergency department. This was sent for laboratory testing, findings above.    The patient was sent for a CT while in the emergency department, results above.     I discussed the treatment plan with the patient. They expressed understanding of this plan and consented to discharge.   Impression & Plan      Medical Decision Making:  Danna Suarez is a 18 year old who presents for  evaluation of epigastric abdominal pain and vomiting. I considered a broad differential diagnosis for this patient including gastritis, GERD, cholecystitis, choledocolithiasis, biliary colic, pancreatitis, colonic or small bowel pathology, vascular etiologies including aneurysm, ulcer. Given that her pain had increased significantly since her last ED visit 3 days ago, a CT was ordered and returned unremarkable. Signs and symptoms here are consistent with gastritis. No peritoneal signs. Tolerates PO. Patient experienced relief here with above treatment. There are no signs of any serious etiologies as mentioned above or intraabdominal catastrophes. Doubt perforated ulcer at this point based on exam and history. Follow up with PCP 3-7 days. Consider endoscopy and GI referral if further symptoms despite this. Gastritis precautions given for home. Patient will be discharged to home with a prescription for Carafate and omeprazole.    Diagnosis:    ICD-10-CM    1. Abdominal pain, epigastric R10.13        Disposition:   Findings and plan explained to the Patient. Patient discharged home with instructions regarding supportive care, medications, and reasons to return. The importance of close follow-up was reviewed. The patient was prescribed Prilosec, Zofran, and Carafate.     Discharge Medications:  New Prescriptions    OMEPRAZOLE (PRILOSEC) 20 MG CR CAPSULE    Take 1 capsule (20 mg) by mouth daily    ONDANSETRON (ZOFRAN ODT) 4 MG ODT TAB    Take 1 tablet (4 mg) by mouth every 8 hours as needed for nausea    SUCRALFATE (CARAFATE) 1 GM/10ML SUSPENSION    Take 10 mLs (1 g) by mouth 4 times daily       Scribe Disclosure:  Catherine MELÉNDEZ, am serving as a scribe at 3:38 AM on 7/30/2018 to document services personally performed by Brandon Taylor MD, based on my observations and the provider's statements to me.    7/30/2018    EMERGENCY DEPARTMENT       Brandon Taylor MD  07/30/18 [id number]

## 2018-07-30 NOTE — DISCHARGE INSTRUCTIONS
Epigastric Pain (Uncertain Cause)     Epigastric pain can be a sign of disease in the upper abdomen. Common causes include:    Acid reflux (stomach acid flowing up into the esophagus)    Gastritis (irritation of the stomach lining)    Peptic Ulcer Disease    Inflammation of the pancreas    Gallstone    Infection in the gallbladder  Pain may be dull or burning. It may spread upward to the chest or to the back. There may be other symptoms such as belching, bloating, cramps or hunger pains. There may be weight loss or poor appetite, nausea or vomiting.  Since the diagnosis of your pain is not certain yet, further tests may sometimes be needed. Sometimes the doctor will treat you for the most likely condition to see if there is improvement before doing further tests.  Home care  Medicines    Antacids help neutralize the normal acids in your stomach. Examples are Maalox, Mylanta, Rolaids, and Tums. If you don t like the liquid, you can also try a chewable one. You may find one works better than another for you. Overuse can cause diarrhea or constipation.    Acid blockers (H2 blockers) decrease acid production. Examples are cimetidine (Tagamet), famotidine (Pepcid) and ranitidine (Zantac).    Acid inhibitors (PPIs) decrease acid production in a different way than the blockers. You may find they work better, but can take a little longer to take effect.  Examples are omeprazole (Prilosec), lansoprazole (Prevacid), pantoprazole (Protonix), rabeprazole (Aciphex), and esomeprazole (Nexium).    Take an antacid 30-60 minutes after eating and at bedtime, but not at the same time as an acid blocker.    Try not to take NSAIDs. Aspirin may also cause problems, but if taking it for your heart or other medical reasons, talk to your doctor before stopping it; you do not want to cause a worse problem, like a heart attack or stroke.  Diet    If certain foods seem to cause your spasm, try to avoid them.     Eat slowly and chew food well  before swallowing. Symptoms of gastritis can be worsened by certain foods. Limit or avoid fatty, fried, and spicy foods, as well as coffee, chocolate, mint, and foods with high acid content such as tomatoes and citrus fruit and juices (orange, grapefruit, lemon).    Avoid alcohol, caffeine, and tobacco, which can delay healing and worsen your problem.    Try eating smaller meals with snacks in between  Follow-up care  Follow up with your healthcare provider or as advised.  When to seek medical advice  Call your healthcare provider right away if any of the following occur:    Stomach pain worsens or moves to the right lower part of the abdomen    Chest pain appears, or if it worsens or spreads to the chest, back, neck, shoulder, or arm    Frequent vomiting (can t keep down liquids)    Blood in the stool or vomit (red or black color)    Feeling weak or dizzy, fainting, or having trouble breathing    Fever of 100.4 F (38 C) or higher, or as directed by your healthcare provider    Abdominal swelling  Date Last Reviewed: 9/25/2015 2000-2017 [employer]. [address] [place] [address], [place] [address]. All rights reserved. This information is not intended as a substitute for professional medical care. Always follow your healthcare professional's instructions.

## 2018-07-30 NOTE — ED AVS SNAPSHOT
Emergency Department    [address] [facility] [address]    Phone:  [phone]    Fax:  [phone]                                       Danna Suarez   MRN: [id number]    Department:   Emergency Department   Date of Visit:  7/30/2018           Patient Information     Date Of Birth          2000        Your diagnoses for this visit were:     Abdominal pain, epigastric        You were seen by Brandon Taylor MD.      Follow-up Information     Follow up with Clinic, [facility]. Schedule an appointment as soon as possible for a visit in 2 days.    Why:  for re-check from abdominal pain seen for in ED tonight    Contact information:    [address] [facility] [address] [phone]          Follow up with  Emergency Department.    Specialty:  EMERGENCY MEDICINE    Why:  If symptoms worsen    Contact information:    [address] [facility] [id number] [phone]        Discharge Instructions         Epigastric Pain (Uncertain Cause)     Epigastric pain can be a sign of disease in the upper abdomen. Common causes include:    Acid reflux (stomach acid flowing up into the esophagus)    Gastritis (irritation of the stomach lining)    Peptic Ulcer Disease    Inflammation of the pancreas    Gallstone    Infection in the gallbladder  Pain may be dull or burning. It may spread upward to the chest or to the back. There may be other symptoms such as belching, bloating, cramps or hunger pains. There may be weight loss or poor appetite, nausea or vomiting.  Since the diagnosis of your pain is not certain yet, further tests may sometimes be needed. Sometimes the doctor will treat you for the most likely condition to see if there is improvement before doing further tests.  Home care  Medicines    Antacids help neutralize the normal acids in your stomach. Examples are Maalox, Mylanta, Rolaids, and Tums. If you don t like the liquid, you can also try a  chewable one. You may find one works better than another for you. Overuse can cause diarrhea or constipation.    Acid blockers (H2 blockers) decrease acid production. Examples are cimetidine (Tagamet), famotidine (Pepcid) and ranitidine (Zantac).    Acid inhibitors (PPIs) decrease acid production in a different way than the blockers. You may find they work better, but can take a little longer to take effect.  Examples are omeprazole (Prilosec), lansoprazole (Prevacid), pantoprazole (Protonix), rabeprazole (Aciphex), and esomeprazole (Nexium).    Take an antacid 30-60 minutes after eating and at bedtime, but not at the same time as an acid blocker.    Try not to take NSAIDs. Aspirin may also cause problems, but if taking it for your heart or other medical reasons, talk to your doctor before stopping it; you do not want to cause a worse problem, like a heart attack or stroke.  Diet    If certain foods seem to cause your spasm, try to avoid them.     Eat slowly and chew food well before swallowing. Symptoms of gastritis can be worsened by certain foods. Limit or avoid fatty, fried, and spicy foods, as well as coffee, chocolate, mint, and foods with high acid content such as tomatoes and citrus fruit and juices (orange, grapefruit, lemon).    Avoid alcohol, caffeine, and tobacco, which can delay healing and worsen your problem.    Try eating smaller meals with snacks in between  Follow-up care  Follow up with your healthcare provider or as advised.  When to seek medical advice  Call your healthcare provider right away if any of the following occur:    Stomach pain worsens or moves to the right lower part of the abdomen    Chest pain appears, or if it worsens or spreads to the chest, back, neck, shoulder, or arm    Frequent vomiting (can t keep down liquids)    Blood in the stool or vomit (red or black color)    Feeling weak or dizzy, fainting, or having trouble breathing    Fever of 100.4 F (38 C) or higher, or as  directed by your healthcare provider    Abdominal swelling  Date Last Reviewed: 9/25/2015 2000-2017 [employer]. [address] [place] [address], [place] [address]. All rights reserved. This information is not intended as a substitute for professional medical care. Always follow your healthcare professional's instructions.          Discharge References/Attachments     GASTRITIS (ADULT) (ENGLISH)    SUCRALFATE ORAL SUSPENSION (ENGLISH)      24 Hour Appointment Hotline       To make an appointment at any [facility], call [phone] ([phone]). If you don't have a family doctor or clinic, we will help you find one. [facility] clinics are conveniently located to serve the needs of you and your family.             Review of your medicines      START taking        Dose / Directions Last dose taken    omeprazole 20 MG CR capsule   Commonly known as:  priLOSEC   Dose:  20 mg   Quantity:  30 capsule        Take 1 capsule (20 mg) by mouth daily   Refills:  0        ondansetron 4 MG ODT tab   Commonly known as:  ZOFRAN ODT   Dose:  4 mg   Quantity:  10 tablet        Take 1 tablet (4 mg) by mouth every 8 hours as needed for nausea   Refills:  0        sucralfate 1 GM/10ML suspension   Commonly known as:  CARAFATE   Dose:  1 g   Quantity:  420 mL        Take 10 mLs (1 g) by mouth 4 times daily   Refills:  1          Our records show that you are taking the medicines listed below. If these are incorrect, please call your family doctor or clinic.        Dose / Directions Last dose taken    EPINEPHrine 0.3 MG/0.3ML injection 2-pack   Commonly known as:  EPIPEN/ADRENACLICK/or ANY BX GENERIC EQUIV   Dose:  0.3 mg        Inject 0.3 mg into the muscle once as needed for anaphylaxis 7/13/15 - Auth'd at [phone] for use in prophylaxis against anaphylactic reaction to NSAIDS.   Refills:  0                Prescriptions were sent or printed at these locations (3 Prescriptions)                   Other Prescriptions                 Printed at Department/Unit printer (3 of 3)         omeprazole (PRILOSEC) 20 MG CR capsule               ondansetron (ZOFRAN ODT) 4 MG ODT tab               sucralfate (CARAFATE) 1 GM/10ML suspension                Procedures and tests performed during your visit     CBC with platelets differential    CT Abdomen Pelvis w Contrast    Comprehensive metabolic panel    HCG qualitative urine (UPT)    UA with Microscopic      Orders Needing Specimen Collection     None      Pending Results     Date and Time Order Name Status Description    7/30/2018 0349 CT Abdomen Pelvis w Contrast Preliminary             Pending Culture Results     No orders found from 7/28/2018 to 7/31/2018.            Pending Results Instructions     If you had any lab results that were not finalized at the time of your Discharge, you can call the ED Lab Result RN at [phone]. You will be contacted by this team for any positive Lab results or changes in treatment. The nurses are available 7 days a week from 10A to 6:30P.  You can leave a message 24 hours per day and they will return your call.        Test Results From Your Hospital Stay        7/30/2018  4:14 AM      Component Results     Component Value Ref Range & Units Status    Color Urine Straw  Final    Appearance Urine Clear  Final    Glucose Urine Negative NEG^Negative mg/dL Final    Bilirubin Urine Negative NEG^Negative Final    Ketones Urine Negative NEG^Negative mg/dL Final    Specific Gravity Urine 1.005 1.003 - 1.035 Final    Blood Urine Negative NEG^Negative Final    pH Urine 7.0 5.0 - 7.0 pH Final    Protein Albumin Urine Negative NEG^Negative mg/dL Final    Urobilinogen mg/dL Normal 0.0 - 2.0 mg/dL Final    Nitrite Urine Negative NEG^Negative Final    Leukocyte Esterase Urine Negative NEG^Negative Final    Source Midstream Urine  Final    WBC Urine 3 0 - 5 /HPF Final    RBC Urine <1 0 - 2 /HPF Final    Bacteria Urine Few (A) NEG^Negative /HPF Final    Squamous  Epithelial /HPF Urine <1 0 - 1 /HPF Final         7/30/2018  4:16 AM      Component Results     Component Value Ref Range & Units Status    HCG Qual Urine Negative NEG^Negative Final    This test is for screening purposes.  Results should be interpreted along with   the clinical picture.  Confirmation testing is available if warranted by   ordering [id number], HCG Quantitative Pregnancy.           7/30/2018  4:23 AM      Component Results     Component Value Ref Range & Units Status    Sodium 141 133 - 144 mmol/L Final    Potassium 3.4 3.4 - 5.3 mmol/L Final    Chloride 105 94 - 109 mmol/L Final    Carbon Dioxide 28 20 - 32 mmol/L Final    Anion Gap 8 3 - 14 mmol/L Final    Glucose 89 70 - 99 mg/dL Final    Urea Nitrogen 8 7 - 30 mg/dL Final    Creatinine 0.64 0.52 - 1.04 mg/dL Final    GFR Estimate >90 >60 mL/min/1.7m2 Final    Non  GFR Calc    GFR Estimate If Black >90 >60 mL/min/1.7m2 Final    African American GFR Calc    Calcium 8.7 8.5 - 10.1 mg/dL Final    Bilirubin Total 0.5 0.2 - 1.3 mg/dL Final    Albumin 4.1 3.4 - 5.0 g/dL Final    Protein Total 7.5 6.8 - 8.8 g/dL Final    Alkaline Phosphatase 78 40 - 150 U/L Final    ALT 14 0 - 50 U/L Final    AST 15 0 - 45 U/L Final         7/30/2018  3:55 AM      Component Results     Component Value Ref Range & Units Status    WBC 7.7 4.0 - 11.0 10e9/L Final    RBC Count 4.27 3.8 - 5.2 10e12/L Final    Hemoglobin 12.9 11.7 - 15.7 g/dL Final    Hematocrit 37.5 35.0 - 47.0 % Final    MCV 88 78 - 100 fl Final    MCH 30.2 26.5 - 33.0 pg Final    MCHC 34.4 31.5 - 36.5 g/dL Final    RDW 12.6 10.0 - 15.0 % Final    Platelet Count 261 150 - 450 10e9/L Final    Diff Method Automated Method  Final    % Neutrophils 55.5 % Final    % Lymphocytes 35.0 % Final    % Monocytes 6.5 % Final    % Eosinophils 2.5 % Final    % Basophils 0.4 % Final    % Immature Granulocytes 0.1 % Final    Nucleated RBCs 0 0 /100 Final    Absolute Neutrophil 4.3 1.6 - 8.3 10e9/L Final     Absolute Lymphocytes 2.7 0.8 - 5.3 10e9/L Final    Absolute Monocytes 0.5 0.0 - 1.3 10e9/L Final    Absolute Eosinophils 0.2 0.0 - 0.7 10e9/L Final    Absolute Basophils 0.0 0.0 - 0.2 10e9/L Final    Abs Immature Granulocytes 0.0 0 - 0.4 10e9/L Final    Absolute Nucleated RBC 0.0  Final         7/30/2018  4:57 AM      Narrative     CT ABDOMEN/PELVIS WITH CONTRAST   7/30/2018 4:45 AM     HISTORY: Right lower quadrant abdominal pain, concern for  appendicitis.     TECHNIQUE:  CT abdomen and pelvis with 53 mL Isovue-370 IV. Radiation  dose for this scan was reduced using automated exposure control,  adjustment of the mA and/or kV according to patient size, or iterative  reconstruction technique.    COMPARISON: None.    FINDINGS:    Abdomen: There is a curvilinear scar at the left lung base. The heart  size is normal. The liver, spleen, gallbladder, pancreas, adrenal  glands and kidneys are normal in appearance. There is a double IVC, a  normal variant. There is no abdominal or pelvic lymph node  enlargement.    Pelvis: There is an IUD in place. No adnexal mass. Bowel appears  normal without obstruction or inflammation. The appendix is normal.  There is a trace amount of free fluid in the pelvis. No free  intraperitoneal gas.        Impression     IMPRESSION:  1. No acute abnormality. No appendicitis or other bowel inflammation  or obstruction.  2. IUD in place.                Clinical Quality Measure: Blood Pressure Screening     Your blood pressure was checked while you were in the emergency department today. The last reading we obtained was  BP: 106/65 . Please read the guidelines below about what these numbers mean and what you should do about them.  If your systolic blood pressure (the top number) is less than 120 and your diastolic blood pressure (the bottom number) is less than 80, then your blood pressure is normal. There is nothing more that you need to do about it.  If your systolic blood pressure (the top  "number) is 120-139 or your diastolic blood pressure (the bottom number) is 80-89, your blood pressure may be higher than it should be. You should have your blood pressure rechecked within a year by a primary care provider.  If your systolic blood pressure (the top number) is 140 or greater or your diastolic blood pressure (the bottom number) is 90 or greater, you may have high blood pressure. High blood pressure is treatable, but if left untreated over time it can put you at risk for heart attack, stroke, or kidney failure. You should have your blood pressure rechecked by a primary care provider within the next 4 weeks.  If your provider in the emergency department today gave you specific instructions to follow-up with your doctor or provider even sooner than that, you should follow that instruction and not wait for up to 4 weeks for your follow-up visit.        Thank you for choosing [facility]       Thank you for choosing [facility] for your care. Our goal is always to provide you with excellent care. Hearing back from our patients is one way we can continue to improve our services. Please take a few minutes to complete the written survey that you may receive in the mail after you visit with us. Thank you!        [employer] Information     [employer] lets you send messages to your doctor, view your test results, renew your prescriptions, schedule appointments and more. To sign up, go to [email or web address] . Click on \"Log in\" on the left side of the screen, which will take you to the Welcome page. Then click on \"Sign up Now\" on the right side of the page.     You will be asked to enter the access code listed below, as well as some personal information. Please follow the directions to create your username and password.     Your access code is: [email or web address]  Expires: 10/25/2018  8:33 PM     Your access code will  in 90 days. If you need help or a new code, please call your [facility] clinic or [phone].      "   Care EveryWhere ID     This is your Care EveryWhere ID. This could be used by other organizations to access your [facility] medical records  [id number]        Equal Access to Services     YASMIN BLANKENSHIP : Alireza Jennings, betty sneed, martha ryan, elias mandel. [facility] [phone].    ATENCIÓN: Si habla español, tiene a hart disposición servicios gratuitos de asistencia lingüística. Llame al [phone].    We comply with applicable federal civil rights laws and [place] laws. We do not discriminate on the basis of race, color, national origin, age, disability, sex, sexual orientation, or gender identity.            After Visit Summary       This is your record. Keep this with you and show to your community pharmacist(s) and doctor(s) at your next visit.

## 2018-08-12 ENCOUNTER — HOSPITAL ENCOUNTER (EMERGENCY)
Facility: CLINIC | Age: 18
Discharge: HOME | End: 2018-08-12
Attending: EMERGENCY MEDICINE | Admitting: EMERGENCY MEDICINE
Payer: COMMERCIAL

## 2018-08-12 VITALS
OXYGEN SATURATION: 98 % | DIASTOLIC BLOOD PRESSURE: 82 MMHG | BODY MASS INDEX: 17.47 KG/M2 | WEIGHT: 105 LBS | SYSTOLIC BLOOD PRESSURE: 119 MMHG | TEMPERATURE: 98.6 F | HEART RATE: 91 BPM | RESPIRATION RATE: 14 BRPM

## 2018-08-12 DIAGNOSIS — K59.00 CONSTIPATION, UNSPECIFIED CONSTIPATION TYPE: ICD-10-CM

## 2018-08-12 LAB
ALBUMIN SERPL-MCNC: 4.1 G/DL (ref 3.4–5)
ALP SERPL-CCNC: 81 U/L (ref 40–150)
ALT SERPL W P-5'-P-CCNC: 17 U/L (ref 0–50)
ANION GAP SERPL CALCULATED.3IONS-SCNC: 10 MMOL/L (ref 3–14)
AST SERPL W P-5'-P-CCNC: 14 U/L (ref 0–45)
BASOPHILS # BLD AUTO: 0 10E9/L (ref 0–0.2)
BASOPHILS NFR BLD AUTO: 0.3 %
BILIRUB SERPL-MCNC: 0.8 MG/DL (ref 0.2–1.3)
BUN SERPL-MCNC: 12 MG/DL (ref 7–30)
CALCIUM SERPL-MCNC: 9.1 MG/DL (ref 8.5–10.1)
CHLORIDE SERPL-SCNC: 109 MMOL/L (ref 94–109)
CO2 SERPL-SCNC: 25 MMOL/L (ref 20–32)
CREAT SERPL-MCNC: 0.73 MG/DL (ref 0.52–1.04)
DIFFERENTIAL METHOD BLD: NORMAL
EOSINOPHIL # BLD AUTO: 0.3 10E9/L (ref 0–0.7)
EOSINOPHIL NFR BLD AUTO: 4.4 %
ERYTHROCYTE [DISTWIDTH] IN BLOOD BY AUTOMATED COUNT: 12.6 % (ref 10–15)
GFR SERPL CREATININE-BSD FRML MDRD: >90 ML/MIN/1.7M2
GLUCOSE SERPL-MCNC: 81 MG/DL (ref 70–99)
HCT VFR BLD AUTO: 41.1 % (ref 35–47)
HGB BLD-MCNC: 14 G/DL (ref 11.7–15.7)
IMM GRANULOCYTES # BLD: 0 10E9/L (ref 0–0.4)
IMM GRANULOCYTES NFR BLD: 0 %
LYMPHOCYTES # BLD AUTO: 1.8 10E9/L (ref 0.8–5.3)
LYMPHOCYTES NFR BLD AUTO: 26.7 %
MCH RBC QN AUTO: 30 PG (ref 26.5–33)
MCHC RBC AUTO-ENTMCNC: 34.1 G/DL (ref 31.5–36.5)
MCV RBC AUTO: 88 FL (ref 78–100)
MONOCYTES # BLD AUTO: 1 10E9/L (ref 0–1.3)
MONOCYTES NFR BLD AUTO: 14.7 %
NEUTROPHILS # BLD AUTO: 3.7 10E9/L (ref 1.6–8.3)
NEUTROPHILS NFR BLD AUTO: 53.9 %
NRBC # BLD AUTO: 0 10*3/UL
NRBC BLD AUTO-RTO: 0 /100
PLATELET # BLD AUTO: 273 10E9/L (ref 150–450)
POTASSIUM SERPL-SCNC: 3.7 MMOL/L (ref 3.4–5.3)
PROT SERPL-MCNC: 8.4 G/DL (ref 6.8–8.8)
RBC # BLD AUTO: 4.67 10E12/L (ref 3.8–5.2)
SODIUM SERPL-SCNC: 144 MMOL/L (ref 133–144)
WBC # BLD AUTO: 6.9 10E9/L (ref 4–11)

## 2018-08-12 PROCEDURE — 99283 EMERGENCY DEPT VISIT LOW MDM: CPT | Mod: Z6 | Performed by: EMERGENCY MEDICINE

## 2018-08-12 PROCEDURE — 85025 COMPLETE CBC W/AUTO DIFF WBC: CPT | Performed by: EMERGENCY MEDICINE

## 2018-08-12 PROCEDURE — 80053 COMPREHEN METABOLIC PANEL: CPT | Performed by: EMERGENCY MEDICINE

## 2018-08-12 PROCEDURE — 99283 EMERGENCY DEPT VISIT LOW MDM: CPT | Performed by: EMERGENCY MEDICINE

## 2018-08-12 ASSESSMENT — ENCOUNTER SYMPTOMS
CONSTIPATION: 1
SHORTNESS OF BREATH: 0
FEVER: 0
ABDOMINAL PAIN: 1

## 2018-08-12 NOTE — ED AVS SNAPSHOT
[facility], Emergency Department    [address]    [place] [address]    Phone:  [phone]    Fax:  [phone]                                       Danna Suarez   MRN: [id number]    Department:  [facility], Emergency Department   Date of Visit:  8/12/2018           After Visit Summary Signature Page     I have received my discharge instructions, and my questions have been answered. I have discussed any challenges I see with this plan with the nurse or doctor.    ..........................................................................................................................................  Patient/Patient Representative Signature      ..........................................................................................................................................  Patient Representative Print Name and Relationship to Patient    ..................................................               ................................................  Date                                            Time    ..........................................................................................................................................  Reviewed by Signature/Title    ...................................................              ..............................................  Date                                                            Time

## 2018-08-12 NOTE — ED AVS SNAPSHOT
[facility], Emergency Department    [address]    [place] [address]    Phone:  [phone]    Fax:  [phone]                                       Danna Suarez   MRN: [id number]    Department:  [facility], Emergency Department   Date of Visit:  8/12/2018           Patient Information     Date Of Birth          2000        Your diagnoses for this visit were:     Constipation, unspecified constipation type        You were seen by Michael Calvin MD.        Discharge Instructions       Please make an appointment to follow up with Your Primary Care Provider as soon as possible.    You may stop the carafate as it may be constipating you.  If you pain worsens then take the carafate and use Miralax as needed.    Return if fever, constant pain or feeling worse.      24 Hour Appointment Hotline       To make an appointment at any [facility] clinic, call [phone] ([phone]). If you don't have a family doctor or clinic, we will help you find one. [facility] clinics are conveniently located to serve the needs of you and your family.             Review of your medicines      Our records show that you are taking the medicines listed below. If these are incorrect, please call your family doctor or clinic.        Dose / Directions Last dose taken    EPINEPHrine 0.3 MG/0.3ML injection 2-pack   Commonly known as:  EPIPEN/ADRENACLICK/or ANY BX GENERIC EQUIV   Dose:  0.3 mg        Inject 0.3 mg into the muscle once as needed for anaphylaxis 7/13/15 - Auth'd at [phone] for use in prophylaxis against anaphylactic reaction to NSAIDS.   Refills:  0        omeprazole 20 MG CR capsule   Commonly known as:  priLOSEC   Dose:  20 mg   Quantity:  30 capsule        Take 1 capsule (20 mg) by mouth daily   Refills:  0        ondansetron 4 MG ODT tab   Commonly known as:  ZOFRAN ODT   Dose:  4 mg   Quantity:  10 tablet        Take 1 tablet (4 mg) by mouth every 8 hours as needed for nausea  "  Refills:  0        sucralfate 1 GM/10ML suspension   Commonly known as:  CARAFATE   Dose:  1 g   Quantity:  420 mL        Take 10 mLs (1 g) by mouth 4 times daily   Refills:  1                Procedures and tests performed during your visit     CBC with platelets differential    Comprehensive metabolic panel      Orders Needing Specimen Collection     None      Pending Results     No orders found from 8/10/2018 to 2018.            Pending Culture Results     No orders found from 8/10/2018 to 2018.            Pending Results Instructions     If you had any lab results that were not finalized at the time of your Discharge, you can call the ED Lab Result RN at [phone]. You will be contacted by this team for any positive Lab results or changes in treatment. The nurses are available 7 days a week from 10A to 6:30P.  You can leave a message 24 hours per day and they will return your call.        Thank you for choosing [facility]       Thank you for choosing [facility] for your care. Our goal is always to provide you with excellent care. Hearing back from our patients is one way we can continue to improve our services. Please take a few minutes to complete the written survey that you may receive in the mail after you visit with us. Thank you!        [facility] Information     [facility] lets you send messages to your doctor, view your test results, renew your prescriptions, schedule appointments and more. To sign up, go to [email or web address] . Click on \"Log in\" on the left side of the screen, which will take you to the Welcome page. Then click on \"Sign up Now\" on the right side of the page.     You will be asked to enter the access code listed below, as well as some personal information. Please follow the directions to create your username and password.     Your access code is: [email or web address]  Expires: 10/25/2018  8:33 PM     Your access code will  in 90 days. If you need help or a new code, please call " your [facility] clinic or [phone].        Care EveryWhere ID     This is your Care EveryWhere ID. This could be used by other organizations to access your [facility] medical records  [id number]        Equal Access to Services     YASMIN BLANKENSHIP : Alireza Jennings, wajostinda luqadaha, qaybta kaalmada connor, elias mandel. So [facility] [phone].    ATENCIÓN: Si habla español, tiene a hart disposición servicios gratuitos de asistencia lingüística. Llame al [phone].    We comply with applicable federal civil rights laws and [place] laws. We do not discriminate on the basis of race, color, national origin, age, disability, sex, sexual orientation, or gender identity.            After Visit Summary       This is your record. Keep this with you and show to your community pharmacist(s) and doctor(s) at your next visit.

## 2018-08-13 NOTE — ED NOTES
Spoke with  about the legal guardian notification per epic. There is no record that the patient has a legal guardian

## 2018-08-13 NOTE — DISCHARGE INSTRUCTIONS
Please make an appointment to follow up with Your Primary Care Provider as soon as possible.    You may stop the carafate as it may be constipating you.  If you pain worsens then take the carafate and use Miralax as needed.    Return if fever, constant pain or feeling worse.

## 2018-08-13 NOTE — ED PROVIDER NOTES
[facility] EMERGENCY DEPARTMENT ([facility])    8/12/18     ED 2 9:22 PM   History     Chief Complaint   Patient presents with     Abdominal Pain     The history is provided by the patient and medical records.     Danna Suarez is a 18 year old female who presents with abdominal pain that started a few months ago.  She has a history of depression and anxiety.  Patient states that this abdominal pain started in her right upper quadrant has been intermittent.  She states that symptoms started to get worse around 2 months ago.  She had dismissed her symptoms for some time but ultimately disclose them while she was visiting her father in [place] last week.  At that time she had poor appetite and an episode of black tarry stools, and this was evaluated at [facility] in [facility] (unable to obtain records via care everywhere).  Was evaluated twice at [facility] in July, undergoing an abdominal CT, labs and UA.  They told her that they felt she had gastritis based on her symptoms, but she states she did not have UGI scoping. She just returned to the [facility] and her mother brought her here for evaluation. She has been trying to eat but has had poor energy with constipation.  Last BM yesterday.  Patient states that this abdominal pain has been waxing waning but occurring with greater frequency now daily. She describes this pain as a thumping sensation that lasts half a minute and then subsides entirely. She denies pain with eating. She states the constipation is new and thinks this may be due to her new medications, including Carafate and antacids. She was prescribed 6 tabs of antiemetics and has since run out of it. She continues to have mild, short lived nausea at times, not now. She came in tonight because the pain has been increasing in frequency. She smokes marijuana several times a week, and feels better after smoking. She does get a little dizzy after smoking but no dry heaves or  "nausea.  She has no abdominal pain presently, and that while the episodes are more frequent the abdominal pain has improved in intensity compared to 2 weeks ago.      Patient has primary cares through [facility], though hasn't really followed up with them regularly. She has a prior history of abdominal pain for which she was evaluated in clinic as well as to ED visit on 7/27 and 7/30.  She has undergone workup with labs which showed a T bili of 1.7.  She had a right upper quadrant ultrasound that was normal.      I have reviewed the Medications, Allergies, Past Medical and Surgical History, and Social History in the [facility] system.  PAST MEDICAL HISTORY:   Past Medical History:   Diagnosis Date     Anxiety      Depression      Mono exposure        PAST SURGICAL HISTORY:   Past Surgical History:   Procedure Laterality Date     NO HISTORY OF SURGERY         FAMILY HISTORY:   Family History   Problem Relation Age of Onset     Parkinsonism Maternal Grandmother      Bipolar Disorder Maternal Grandmother      Depression Mother      Substance Abuse Father      Unknown/Adopted Father      Depression Father      minimal contact with \"outside world\"     Substance Abuse Maternal Grandfather      Depression Maternal Grandfather      Depression Paternal Grandmother      Depression Paternal Grandfather      Substance Abuse Paternal Grandfather      Depression Brother      Anxiety Disorder Brother      Substance Abuse Brother      not on a regular basis     Bipolar Disorder Maternal Aunt      Substance Abuse Maternal Aunt      Bipolar Disorder Maternal Aunt      Substance Abuse Maternal Aunt      Bipolar Disorder Maternal Aunt      Substance Abuse Paternal Uncle      Unknown/Adopted Paternal Uncle        SOCIAL HISTORY:   Social History   Substance Use Topics     Smoking status: Former Smoker     Packs/day: 0.50     Years: 0.50     Types: Cigarettes     Smokeless tobacco: Current User     Alcohol use Yes       Patient's " Medications   New Prescriptions    No medications on file   Previous Medications    EPINEPHRINE (EPIPEN) 0.3 MG/0.3ML INJECTION    Inject 0.3 mg into the muscle once as needed for anaphylaxis 7/13/15 - Auth'd at [phone] for use in prophylaxis against anaphylactic reaction to NSAIDS.    OMEPRAZOLE (PRILOSEC) 20 MG CR CAPSULE    Take 1 capsule (20 mg) by mouth daily    ONDANSETRON (ZOFRAN ODT) 4 MG ODT TAB    Take 1 tablet (4 mg) by mouth every 8 hours as needed for nausea    SUCRALFATE (CARAFATE) 1 GM/10ML SUSPENSION    Take 10 mLs (1 g) by mouth 4 times daily   Modified Medications    No medications on file   Discontinued Medications    No medications on file          Allergies   Allergen Reactions     Nsaids Anaphylaxis     Advil Pm [Ibuprofen-Diphenhydramine Cit]       Review of Systems   Constitutional: Negative for fever.   Respiratory: Negative for shortness of breath.    Cardiovascular: Negative for chest pain.   Gastrointestinal: Positive for abdominal pain and constipation.   All other systems reviewed and are negative.      Physical Exam   BP: 111/65  Pulse: 93  Temp: 98.6  F (37  C)  Resp: 16  Weight: 47.6 kg (105 lb)  SpO2: 97 %      Physical Exam   Constitutional: No distress.   HENT:   Head: Atraumatic.   Mouth/Throat: Oropharynx is clear and moist. No oropharyngeal exudate.   Eyes: Pupils are equal, round, and reactive to light. No scleral icterus.   Cardiovascular: Normal heart sounds and intact distal pulses.    Pulmonary/Chest: Breath sounds normal. No respiratory distress. She has no wheezes. She has no rales.   Abdominal: Soft. Bowel sounds are normal. There is no tenderness. There is no rigidity, no rebound, no guarding and no CVA tenderness.   Musculoskeletal: She exhibits no edema or tenderness.   Skin: Skin is warm. No rash noted. She is not diaphoretic.       ED Course     ED Course     Procedures             Critical Care time:  none             Labs Ordered and Resulted from Time of ED  Arrival Up to the Time of Departure from the ED   CBC WITH PLATELETS DIFFERENTIAL   COMPREHENSIVE METABOLIC PANEL            Assessments & Plan (with Medical Decision Making)   The patient has a history of anxiety and abdominal pain over the last 3 months.  She has become concerned that exposure to mold at work may have been the cause of her symptoms.  Her abdominal pain is actually intermittent and only last for 30 seconds at a time.  The pattern of this lately is more consistent with intermittent constipation cramps which may been brought on by taking the Carafate.  She did have more constant epigastric pain when she was evaluated in July and started on Prilosec and Carafate.  That constant pain is improving and at this time she could stop the Carafate.  She had an episode of black stools last week and although she was evaluated at another hospital I do not have access to those records.  A CBC was done which shows a hemoglobin of 14, actually higher than the last hemoglobin in our system.  She was very concerned about toxicity and her liver function and chemistries are also normal.  The patient smokes marijuana frequently but does not have cyclical vomiting symptoms.  I did explain that this may still be causing some GI upset and she agreed to follow-up with her primary doctor to discuss this further.  She was told that she should follow-up with her doctor as soon as possible as she has developed the pattern of abdominal pain over several months and should have one Dr. working with her to resolve her symptoms.  She does not feel in danger or have any thoughts of hurting herself.  She is mildly tangential but I think it is more related to her anxiety over the exposure to mold.    I have reviewed the nursing notes.    I have reviewed the findings, diagnosis, plan and need for follow up with the patient.    New Prescriptions    No medications on file       Final diagnoses:   Constipation, unspecified constipation  type   I, Marychuy Aguillon, am serving as a trained medical scribe to document services personally performed by Michael Calvin MD based on the provider's statements to me on August 12, 2018.  This document has been checked and approved by the attending provider.    I, Michael Calvin MD, was physically present and have reviewed and verified the accuracy of this note documented by Marychuy Aguillon, medical scribe.       8/12/2018   [facility], [place], EMERGENCY DEPARTMENT     Michael Calvin MD  08/12/18 1487

## 2018-08-13 NOTE — ED TRIAGE NOTES
Pt was seen at [facility] three weeks ago and was Rx Prilosec, Carafate and Zofran . Pain continues. Pt thinks she is probably exposed to mold at her work place which she quit two weeks ago.  States her thoughts makes no sense.

## 2018-08-13 NOTE — ED NOTES
"Pt states that she thinks that she may have been exposed to black mold. She has abd pain and she points to her L flank. She also states that her \"morals are changing. I  told some small lies to my father.\" She also states that her father is in [place]. Her mother just drove her back from [place] and dropped her off at the ED.  "

## 2019-02-09 ENCOUNTER — APPOINTMENT (OUTPATIENT)
Dept: ULTRASOUND IMAGING | Facility: CLINIC | Age: 19
End: 2019-02-09
Attending: EMERGENCY MEDICINE
Payer: COMMERCIAL

## 2019-02-09 ENCOUNTER — HOSPITAL ENCOUNTER (EMERGENCY)
Facility: CLINIC | Age: 19
Discharge: HOME | End: 2019-02-09
Attending: EMERGENCY MEDICINE | Admitting: EMERGENCY MEDICINE
Payer: COMMERCIAL

## 2019-02-09 VITALS
HEART RATE: 83 BPM | BODY MASS INDEX: 19.99 KG/M2 | DIASTOLIC BLOOD PRESSURE: 58 MMHG | SYSTOLIC BLOOD PRESSURE: 106 MMHG | HEIGHT: 65 IN | OXYGEN SATURATION: 100 % | RESPIRATION RATE: 18 BRPM | TEMPERATURE: 97.9 F | WEIGHT: 120 LBS

## 2019-02-09 DIAGNOSIS — O20.0 THREATENED ABORTION: ICD-10-CM

## 2019-02-09 LAB
ABO + RH BLD: NORMAL
ABO + RH BLD: NORMAL
B-HCG SERPL-ACNC: 3736 IU/L (ref 0–5)
BLOOD BANK CMNT PATIENT-IMP: NORMAL
BLOOD BANK CMNT PATIENT-IMP: NORMAL
HGB BLD-MCNC: 12.5 G/DL (ref 11.7–15.7)
SPECIMEN EXP DATE BLD: NORMAL

## 2019-02-09 PROCEDURE — 76801 OB US < 14 WKS SINGLE FETUS: CPT

## 2019-02-09 PROCEDURE — 86901 BLOOD TYPING SEROLOGIC RH(D): CPT | Performed by: EMERGENCY MEDICINE

## 2019-02-09 PROCEDURE — 96376 TX/PRO/DX INJ SAME DRUG ADON: CPT

## 2019-02-09 PROCEDURE — 96374 THER/PROPH/DIAG INJ IV PUSH: CPT

## 2019-02-09 PROCEDURE — 84702 CHORIONIC GONADOTROPIN TEST: CPT | Performed by: EMERGENCY MEDICINE

## 2019-02-09 PROCEDURE — 99285 EMERGENCY DEPT VISIT HI MDM: CPT | Mod: 25

## 2019-02-09 PROCEDURE — 25000128 H RX IP 250 OP 636: Performed by: EMERGENCY MEDICINE

## 2019-02-09 PROCEDURE — 85018 HEMOGLOBIN: CPT | Performed by: EMERGENCY MEDICINE

## 2019-02-09 RX ORDER — HYDROMORPHONE HYDROCHLORIDE 1 MG/ML
0.5 INJECTION, SOLUTION INTRAMUSCULAR; INTRAVENOUS; SUBCUTANEOUS
Status: DISCONTINUED | OUTPATIENT
Start: 2019-02-09 | End: 2019-02-09 | Stop reason: HOSPADM

## 2019-02-09 RX ORDER — OXYCODONE AND ACETAMINOPHEN 5; 325 MG/1; MG/1
1 TABLET ORAL EVERY 6 HOURS PRN
Qty: 15 TABLET | Refills: 0 | Status: SHIPPED | OUTPATIENT
Start: 2019-02-09 | End: 2019-02-25

## 2019-02-09 RX ADMIN — HYDROMORPHONE HYDROCHLORIDE 0.5 MG: 1 INJECTION, SOLUTION INTRAMUSCULAR; INTRAVENOUS; SUBCUTANEOUS at 01:57

## 2019-02-09 RX ADMIN — HYDROMORPHONE HYDROCHLORIDE 0.5 MG: 1 INJECTION, SOLUTION INTRAMUSCULAR; INTRAVENOUS; SUBCUTANEOUS at 03:19

## 2019-02-09 ASSESSMENT — MIFFLIN-ST. JEOR: SCORE: 1315.2

## 2019-02-09 ASSESSMENT — ENCOUNTER SYMPTOMS
NAUSEA: 0
DIARRHEA: 0
VOMITING: 0

## 2019-02-09 NOTE — ED AVS SNAPSHOT
Emergency Department  [address] [place] [address]  Phone:  [phone]  Fax:  [phone]                                    Danna Suarez   MRN: [id number]    Department:   Emergency Department   Date of Visit:  2/9/2019           After Visit Summary Signature Page    I have received my discharge instructions, and my questions have been answered. I have discussed any challenges I see with this plan with the nurse or doctor.    ..........................................................................................................................................  Patient/Patient Representative Signature      ..........................................................................................................................................  Patient Representative Print Name and Relationship to Patient    ..................................................               ................................................  Date                                   Time    ..........................................................................................................................................  Reviewed by Signature/Title    ...................................................              ..............................................  Date                                               Time          22EPIC Rev 08/18

## 2019-02-09 NOTE — DISCHARGE INSTRUCTIONS
Discharge Instructions  Vaginal Bleeding in Pregnancy    Bleeding in early pregnancy can be a sign of a miscarriage or an abnormal pregnancy, but often is innocent and the pregnancy will continue normally. We may do blood pregnancy tests and ultrasound to try to determine what is causing the bleeding, but sometimes we are unable to tell. If this is the case, it will often require more time, more blood tests, and another ultrasound.     Generally, every Emergency Department visit should have a follow-up clinic visit with either a primary or a specialty clinic/provider. Please follow-up as instructed by your emergency provider today.    Return to the Emergency Department if:  You have severe abdominal (belly) or pelvic pain.  You faint, or feel lightheaded or dizzy.   Your bleeding gets much worse.  You pass any tissue--solid material that does not look like a blood clot. If you pass tissue, save it (even if you have to pull it out of the toilet) and put it in a plastic bag or jar and bring it in.    You have a fever of 100.5 F or higher.  If no pregnancy could be seen:  It may be that everything is normal and it is just too early to see the pregnancy. It is also possible that you could have an ectopic pregnancy, which is a pregnancy in an abnormal location, such as in the tube (?tubal pregnancy?). We don?t see evidence that this is likely today but we cannot exclude it with certainty until a pregnancy can be seen in the uterus (womb) and an ectopic pregnancy can cause severe internal bleeding or death so follow-up is very important.  You should not be alone, in case you suddenly become very sick.   You should not have sex or put anything in your vagina.     If a pregnancy was seen in your uterus:  If a heartbeat could be seen, the chance of miscarriage is lower but because you had bleeding the chance of a miscarriage still exists.  You should not have sex or put anything in your vagina.  Follow-up as directed with  your OB/GYN provider.     Facts about miscarriage: We hope you do not have a miscarriage, but if you do, here are important things to know:  Early miscarriage is very common, and having one miscarriage does not mean you will have problems with another pregnancy.  Nothing you did caused it. Taking medicine, drinking alcohol, having sex, exercising, or falling down will not cause a miscarriage.     If you were given a prescription for medicine here today, be sure to read all of the information (including the package insert) that comes with your prescription.  This will include important information about the medicine, its side effects, and any warnings that you need to know about.  The pharmacist who fills the prescription can provide more information and answer questions you may have about the medicine.  If you have questions or concerns that the pharmacist cannot address, please call or return to the Emergency Department.   Remember that you can always come back to the Emergency Department if you are not able to see your regular provider in the amount of time listed above, if you get any new symptoms, or if there is anything that worries you.

## 2019-02-09 NOTE — ED PROVIDER NOTES
"  History     Chief Complaint:  Vaginal bleeding     HPI   Danna Suarez is a 18 year old  female at approximately 8 weeks gestation who presents with vaginal bleeding.  The patient reports her last menstrual period was approximately 2 months ago and she had a positive home pregnancy test last week.  Yesterday she developed generalized pelvic cramping, similar to what she has with a period.  Today at work her cramping worsened and she noticed she was bleeding quite briskly.  She put on a pad when she returned home which became saturated after a couple hours.  Her pain is worse with walking or if she reaches for something.  Currently she rates her pain at 7/10.  She did take Tylenol earlier however it was not significantly helpful.  This is her second pregnancy with the first pregnancy ending in miscarriage about 2 years ago.  She is uncertain of her blood type.    Allergies:  NSAIDs - anaphylaxis     Medications:    EpiPen  Zofran  Sucralfate    Past Medical History:    Anxiety  Depression    Past Surgical History:    History reviewed. No pertinent past surgical history.     Family History:    Depression - mother, father, brother  Anxiety - brother   Substance abuse - father, brother     Social History:  Presents to the ER with her mother  Tobacco Use: Former smoker  Alcohol Use: Occasional alcohol use.   Drug use: Marijuana, acid use.   PCP: [facility]      Review of Systems   Gastrointestinal: Negative for diarrhea, nausea and vomiting.   Genitourinary: Positive for pelvic pain and vaginal bleeding.   All other systems reviewed and are negative.      Physical Exam   First Vitals:  BP: 103/62  Heart Rate: 105  Temp: 97.9  F (36.6  C)  Resp: 18  Height: 165.1 cm (5' 5\")  Weight: 54.4 kg (120 lb)  SpO2: 100 %    Physical Exam  Eye:  Pupils are equal, round, and reactive.  Extraocular movements intact.    ENT:  No rhinorrhea.  Moist mucus membranes.  Normal tongue and " tonsil.    Cardiac:  Regular rate and rhythm.  No murmurs, gallops, or rubs.    Pulmonary:  Clear to auscultation bilaterally.  No wheezes, rales, or rhonchi.    Abdomen:  Focal tenderness through the suprapubic are without rebound or guarding     Musculoskeletal:  Normal movement of all extremities without evidence for deficit.    Skin:  Warm and dry without rashes.    Neurologic:  Non-focal exam without asymmetric weakness or numbness.     Psychiatric:  Normal affect with appropriate interaction with examiner.     Emergency Department Course     Imaging:  Obstetric ultrasound <14 weeks with transvaginal:  IMPRESSION: Single, live intrauterine gestation measures 6 weeks 1 day. Slow fetal heart rate and relative difficulty in detection of heart rate likely related to early gestational age. Short interval follow-up and serial hCG recommended.   Report per radiology.     Radiographic findings were communicated with the patient who voiced understanding of the findings.    Laboratory:  Hemoglobin: 12.5  HCG, Quantitative: 3736 (H)    Rh type: O positive    Interventions:   (0157) Dilaudid, 0.5 mg, IV injection     Emergency Department Course:  Nursing notes and vitals reviewed.  I performed an exam of the patient as documented above.     A peripheral IV was established. Blood was drawn from the patient. This was sent for laboratory testing, findings above.     The patient was sent for an obstetrical ultrasound  while in the emergency department, findings above.      Findings and plan explained to the patient. Patient discharged home with instructions regarding supportive care, medications, and reasons to return. The importance of close follow-up was reviewed. The patient was prescribed Percocet.     Impression & Plan      Medical Decision Making:  This unfortunate young woman,  at 8 weeks by dates presents to us with complaints of having lower abdominal cramping and vaginal bleeding.  She has tenderness in the  suprapubic area but no other signs of peritonitis.  Her quantitative hCG is greater than 3700, though her ultrasound only shows a 6-week intrauterine pregnancy with a very slow heart rate.  She is O+.  At this juncture, I feel she is appropriate for discharge home with close outpatient follow-up.  We discussed her threatened  and the high likelihood that this will proceed.  She will be prescribed pain medication and advised to follow-up with obstetrics and gynecology on Monday for a repeat quantitative hCG and to see how she is progressing.  She was advised to return to the emergency department for significant hemorrhage, severe pain, or any other emergent concerns.    Diagnosis:    ICD-10-CM    1. Threatened  O20.0      Disposition:  Discharged to home.     Discharge Medications:     Medication List      Started    oxyCODONE-acetaminophen 5-325 MG tablet  Commonly known as:  PERCOCET  1 tablet, Oral, EVERY 6 HOURS PRN         I, Mariza Ricci, am serving as a scribe on 2019 at 1:37 AM to personally document services performed by Dr. Trierweiler based on my observations and the provider's statements to me.    Mariza Ricci  2019    EMERGENCY DEPARTMENT       Trierweiler, Chad A, MD  [phone]

## 2019-02-25 ENCOUNTER — APPOINTMENT (OUTPATIENT)
Dept: GENERAL RADIOLOGY | Facility: CLINIC | Age: 19
End: 2019-02-25
Attending: PHYSICIAN ASSISTANT
Payer: COMMERCIAL

## 2019-02-25 ENCOUNTER — HOSPITAL ENCOUNTER (EMERGENCY)
Facility: CLINIC | Age: 19
Discharge: HOME | End: 2019-02-25
Attending: PHYSICIAN ASSISTANT | Admitting: PHYSICIAN ASSISTANT
Payer: COMMERCIAL

## 2019-02-25 VITALS
BODY MASS INDEX: 18.36 KG/M2 | OXYGEN SATURATION: 99 % | HEIGHT: 67 IN | TEMPERATURE: 98.3 F | WEIGHT: 117 LBS | RESPIRATION RATE: 16 BRPM | HEART RATE: 92 BPM | DIASTOLIC BLOOD PRESSURE: 46 MMHG | SYSTOLIC BLOOD PRESSURE: 93 MMHG

## 2019-02-25 DIAGNOSIS — R51.9 HEADACHE: ICD-10-CM

## 2019-02-25 DIAGNOSIS — J11.1 INFLUENZA-LIKE ILLNESS: ICD-10-CM

## 2019-02-25 LAB
ALBUMIN UR-MCNC: 10 MG/DL
ANION GAP SERPL CALCULATED.3IONS-SCNC: 10 MMOL/L (ref 3–14)
APPEARANCE UR: CLEAR
BACTERIA #/AREA URNS HPF: ABNORMAL /HPF
BASOPHILS # BLD AUTO: 0 10E9/L (ref 0–0.2)
BASOPHILS NFR BLD AUTO: 0.2 %
BILIRUB UR QL STRIP: NEGATIVE
BUN SERPL-MCNC: 8 MG/DL (ref 7–30)
CALCIUM SERPL-MCNC: 8.4 MG/DL (ref 8.5–10.1)
CHLORIDE SERPL-SCNC: 105 MMOL/L (ref 94–109)
CO2 SERPL-SCNC: 24 MMOL/L (ref 20–32)
COLOR UR AUTO: YELLOW
CREAT SERPL-MCNC: 0.54 MG/DL (ref 0.52–1.04)
DEPRECATED S PYO AG THROAT QL EIA: NORMAL
DIFFERENTIAL METHOD BLD: ABNORMAL
EOSINOPHIL # BLD AUTO: 0.1 10E9/L (ref 0–0.7)
EOSINOPHIL NFR BLD AUTO: 0.5 %
ERYTHROCYTE [DISTWIDTH] IN BLOOD BY AUTOMATED COUNT: 12.1 % (ref 10–15)
FLUAV+FLUBV AG SPEC QL: NEGATIVE
FLUAV+FLUBV AG SPEC QL: NEGATIVE
GFR SERPL CREATININE-BSD FRML MDRD: >90 ML/MIN/{1.73_M2}
GLUCOSE SERPL-MCNC: 113 MG/DL (ref 70–99)
GLUCOSE UR STRIP-MCNC: NEGATIVE MG/DL
HCG UR QL: NEGATIVE
HCT VFR BLD AUTO: 37.5 % (ref 35–47)
HGB BLD-MCNC: 12.9 G/DL (ref 11.7–15.7)
HGB UR QL STRIP: ABNORMAL
IMM GRANULOCYTES # BLD: 0 10E9/L (ref 0–0.4)
IMM GRANULOCYTES NFR BLD: 0.2 %
KETONES UR STRIP-MCNC: 40 MG/DL
LEUKOCYTE ESTERASE UR QL STRIP: NEGATIVE
LYMPHOCYTES # BLD AUTO: 0.9 10E9/L (ref 0.8–5.3)
LYMPHOCYTES NFR BLD AUTO: 6.2 %
MCH RBC QN AUTO: 29.7 PG (ref 26.5–33)
MCHC RBC AUTO-ENTMCNC: 34.4 G/DL (ref 31.5–36.5)
MCV RBC AUTO: 86 FL (ref 78–100)
MONOCYTES # BLD AUTO: 1 10E9/L (ref 0–1.3)
MONOCYTES NFR BLD AUTO: 6.9 %
MUCOUS THREADS #/AREA URNS LPF: PRESENT /LPF
NEUTROPHILS # BLD AUTO: 12.8 10E9/L (ref 1.6–8.3)
NEUTROPHILS NFR BLD AUTO: 86 %
NITRATE UR QL: NEGATIVE
NRBC # BLD AUTO: 0 10*3/UL
NRBC BLD AUTO-RTO: 0 /100
PH UR STRIP: 7 PH (ref 5–7)
PLATELET # BLD AUTO: 252 10E9/L (ref 150–450)
POTASSIUM SERPL-SCNC: 3 MMOL/L (ref 3.4–5.3)
RBC # BLD AUTO: 4.34 10E12/L (ref 3.8–5.2)
RBC #/AREA URNS AUTO: 4 /HPF (ref 0–2)
SODIUM SERPL-SCNC: 139 MMOL/L (ref 133–144)
SOURCE: ABNORMAL
SP GR UR STRIP: 1.02 (ref 1–1.03)
SPECIMEN SOURCE: NORMAL
SPECIMEN SOURCE: NORMAL
SQUAMOUS #/AREA URNS AUTO: 4 /HPF (ref 0–1)
UROBILINOGEN UR STRIP-MCNC: 2 MG/DL (ref 0–2)
WBC # BLD AUTO: 14.9 10E9/L (ref 4–11)
WBC #/AREA URNS AUTO: 10 /HPF (ref 0–5)

## 2019-02-25 PROCEDURE — 71046 X-RAY EXAM CHEST 2 VIEWS: CPT

## 2019-02-25 PROCEDURE — 80048 BASIC METABOLIC PNL TOTAL CA: CPT | Performed by: EMERGENCY MEDICINE

## 2019-02-25 PROCEDURE — 81025 URINE PREGNANCY TEST: CPT | Performed by: PHYSICIAN ASSISTANT

## 2019-02-25 PROCEDURE — 87804 INFLUENZA ASSAY W/OPTIC: CPT | Performed by: PHYSICIAN ASSISTANT

## 2019-02-25 PROCEDURE — 99284 EMERGENCY DEPT VISIT MOD MDM: CPT | Mod: 25

## 2019-02-25 PROCEDURE — 87081 CULTURE SCREEN ONLY: CPT | Performed by: PHYSICIAN ASSISTANT

## 2019-02-25 PROCEDURE — 81001 URINALYSIS AUTO W/SCOPE: CPT | Performed by: PHYSICIAN ASSISTANT

## 2019-02-25 PROCEDURE — 87880 STREP A ASSAY W/OPTIC: CPT | Performed by: PHYSICIAN ASSISTANT

## 2019-02-25 PROCEDURE — 85025 COMPLETE CBC W/AUTO DIFF WBC: CPT | Performed by: EMERGENCY MEDICINE

## 2019-02-25 PROCEDURE — 25000132 ZZH RX MED GY IP 250 OP 250 PS 637: Performed by: PHYSICIAN ASSISTANT

## 2019-02-25 PROCEDURE — 25000128 H RX IP 250 OP 636: Performed by: PHYSICIAN ASSISTANT

## 2019-02-25 PROCEDURE — 96361 HYDRATE IV INFUSION ADD-ON: CPT

## 2019-02-25 PROCEDURE — 96374 THER/PROPH/DIAG INJ IV PUSH: CPT

## 2019-02-25 RX ORDER — ACETAMINOPHEN 325 MG/1
650 TABLET ORAL ONCE
Status: COMPLETED | OUTPATIENT
Start: 2019-02-25 | End: 2019-02-25

## 2019-02-25 RX ORDER — ONDANSETRON 2 MG/ML
4 INJECTION INTRAMUSCULAR; INTRAVENOUS ONCE
Status: COMPLETED | OUTPATIENT
Start: 2019-02-25 | End: 2019-02-25

## 2019-02-25 RX ADMIN — ACETAMINOPHEN 650 MG: 325 TABLET, FILM COATED ORAL at 15:19

## 2019-02-25 RX ADMIN — SODIUM CHLORIDE 1000 ML: 9 INJECTION, SOLUTION INTRAVENOUS at 16:52

## 2019-02-25 RX ADMIN — ONDANSETRON 4 MG: 2 INJECTION INTRAMUSCULAR; INTRAVENOUS at 15:20

## 2019-02-25 RX ADMIN — SODIUM CHLORIDE 1000 ML: 9 INJECTION, SOLUTION INTRAVENOUS at 15:19

## 2019-02-25 ASSESSMENT — ENCOUNTER SYMPTOMS
DIFFICULTY URINATING: 0
ABDOMINAL PAIN: 0
COUGH: 1
HEADACHES: 1
SORE THROAT: 1
DYSURIA: 0
VOMITING: 0
NAUSEA: 1
DIARRHEA: 0

## 2019-02-25 ASSESSMENT — MIFFLIN-ST. JEOR: SCORE: 1333.34

## 2019-02-25 NOTE — ED PROVIDER NOTES
History     Chief Complaint:  Flu Symptoms    HPI   Danna Suarez is a 18 year old female who presents for evaluation of flu symptoms. She states she has been feeling unwell for the past 2 days with generalized body aches, neck pain, sore throat, cough, sneezing, and feeling dehydrated. Yesterday, she woke up with a headache and feeling dehydrated and nauseous. She states she can only get about an hour of sleep at a time before she wakes up feeling warm and dehydrated. Denies dysuria, or other urinary symptoms, no vomiting, diarrhea, or abdominal pain. Denies history of migraines. She has been taking Tylenol for her symptoms, last had a dose 4 hours prior to arrival.     Allergies:  Nsaids  Advil Pm [Ibuprofen-Diphenhydramine Cit]      Medications:    The patient is not currently taking any prescribed medications.      Past Medical History:    Anxiety   Depression    Past Surgical History:    The patient does not have any pertinent past surgical history.     Family History:    Depression  Substance abuse   Anxiety    Social History:  Relationship status: Single  Tobacco use: Former 0.50 PPD.  Alcohol use: Yes  Drug use: Yes, marijuana.  The patient presents on her own.    Marital Status:  Single [1]     Review of Systems   HENT: Positive for sneezing and sore throat.    Respiratory: Positive for cough.    Gastrointestinal: Positive for nausea. Negative for abdominal pain, diarrhea and vomiting.   Genitourinary: Negative for difficulty urinating and dysuria.   Neurological: Positive for headaches.   All other systems reviewed and are negative.    Physical Exam     Patient Vitals for the past 24 hrs:   BP Temp Temp src Pulse Heart Rate Resp SpO2 Height Weight   02/25/19 1751 93/46 -- -- 92 -- -- 99 % -- --   02/25/19 1615 100/62 -- -- -- -- -- 99 % -- --   02/25/19 1600 103/66 -- -- 91 -- -- 100 % -- --   02/25/19 1545 105/67 -- -- -- -- -- 100 % -- --   02/25/19 1530 111/78 -- -- 90 -- -- 100 % -- --  "  02/25/19 1227 97/61 98.3  F (36.8  C) Oral -- 137 16 99 % 1.702 m (5' 7\") 53.1 kg (117 lb)        Physical Exam  General: Alert, interactive. GCS 15  Head:  Scalp is atraumatic.  Eyes:  EOM intact. The pupils are equal, round, and reactive to light. No scleral icterus.  ENT:                                      Ears:  The external ears are normal. TM's non-erythematous. External canals normal.    Nose:  The external nose is normal.  Throat:  The oropharynx is erythematous with mildly swollen tonsils. No exudate. Uvula midline. No sublingual fullness. Mucus membranes are moist.                 Neck:  Normal range of motion. There is no rigidity.   CV:  Regular rate and rhythm. No murmur. 2+ radial pulses  Resp:  Breath sounds are clear bilaterally. Non-labored, no retractions or accessory muscle use.  GI:  Abdomen is soft, no distension, no tenderness.   MS:  Normal range of motion.   Skin:  Warm and dry.   Neuro:  Strength and sensation grossly intact.   Psych:  Awake. Alert.  Appropriate interactions.     Emergency Department Course     Imaging:  Radiology findings were communicated with the patient who voiced understanding of the findings.  Chest XR,  PA & LAT   Final Result   IMPRESSION: PA and lateral views of the chest. Lungs are clear. Heart   is normal in size. No effusions are evident. No pneumothorax.      VERONICA SUAREZ MD        Laboratory:  Laboratory findings were communicated with the patient who voiced understanding of the findings.    CBC: WBC: 14.9(H), Abs neutrophil: 12.8(H) o/w WNL (HGB 12.9, )  BMP: Potassium: 3.0(L), Glucose: 113(H), Calcium: 8.4(L) o/w WNL (Creatinine 0.54)   Influenza A/B antigen: Negative  Rapid Strep Test: Negative  Strep Culture: Pending   HCG qualitative urine: Negative  UA with Microscopic: Urine ketones: 40(A), Urine blood: trace(A), Protein albumin: 10(A), WBC: 10(H), RBC: 4(H), Bacteria: moderate(A), Squamous epithelial: 4(H), Mucous: present(A) o/w WNL "     Interventions:  1519 Normal Saline 1000 mL IV   1519 Tylenol 650 mg Oral   1520 Zofran 4 mg IV  1652 Normal Saline 1000 mL IV      Emergency Department Course:  Nursing notes and vitals reviewed.  Swab sample obtained for influenza testing, results above.   IV was inserted and blood was drawn for laboratory testing, results above.   The patient was sent for a XR while in the emergency department, results above.    The patient provided a urine sample here in the emergency department. This was sent for laboratory testing, findings above.     1508 I performed an exam of the patient as documented above.   1655 I reassessed the patient.   1738 I reassessed and updated the patient.     Findings and plan explained to the Patient. Patient discharged home with instructions regarding supportive care, medications, and reasons to return. The importance of close follow-up was reviewed.      I personally reviewed the laboratory results with the Patient and answered all related questions prior to discharge.    Impression & Plan      Medical Decision Making:  Danna Suarez is a 18 year old female who presents for evaluation of cough, fever, sore throat, and myalgias.  Tachycardic on arrival, otherwise vitals normal.  Afebrile here, though patient notes low-grade temperatures at home.  Lab work collected by triage and CBC and BMP sent.  Leukocytosis of 14.9, otherwise lab work essentially unremarkable.  Unclear cause at this time.  Influenza negative.  Strep test negative.  Urine shows no sign of infection.  Chest x-ray negative with no sign of infiltrate.  Influenza negative, though I discussed with the patient the sensitivity of this test and signs and symptoms are most consistent with influenza.   on recheck, after rehydration here, Tylenol, and Zofran, patient felt markedly improved and headache completely resolved.  She has no abdominal tenderness to suggest intra-abdominal etiology.  Discussed symptomatic treatment  including Tylenol and ibuprofen and importance of staying hydrated.  Reasons to return were outlined and patient agrees with the plan.  All questions/concerns addressed prior to discharge home.  Recommend following up with primary care provider in 2-3 days for ongoing symptoms.      Diagnosis:    ICD-10-CM    1. Influenza-like illness R69 Beta strep group A culture   2. Headache R51        Disposition:   Discharged to home       Scribe Disclosure:  I, Yasmeen Brown, am serving as a scribe at 3:02 PM on 2/25/2019 to document services personally performed by Charley Mc PA-C based on my observations and the provider's statements to me.       Yasmeen Brown  2/25/2019    EMERGENCY DEPARTMENT       Charley Mc PA-C  02/25/19 9773

## 2019-02-25 NOTE — ED AVS SNAPSHOT
Emergency Department  [address] [place] [address]  Phone:  [phone]  Fax:  [phone]                                    Danna Suarez   MRN: [id number]    Department:   Emergency Department   Date of Visit:  2/25/2019           After Visit Summary Signature Page    I have received my discharge instructions, and my questions have been answered. I have discussed any challenges I see with this plan with the nurse or doctor.    ..........................................................................................................................................  Patient/Patient Representative Signature      ..........................................................................................................................................  Patient Representative Print Name and Relationship to Patient    ..................................................               ................................................  Date                                   Time    ..........................................................................................................................................  Reviewed by Signature/Title    ...................................................              ..............................................  Date                                               Time          22EPIC Rev 08/18

## 2019-02-25 NOTE — DISCHARGE INSTRUCTIONS
*No school or work until you have been without a fever for 24 hours without tylenol or motrin.  *Take medications as prescribed.  Ibuprofen and/or tylenol for pain.  Continue your current medications  *Follow-up with your doctor for a recheck in 2-3 days.    *Return if you develop difficulty breathing, neck stiffness, confusion status changes are unable to keep fluids down, faint or feel like you will faint or become worse in any way.    Discharge Instructions  Influenza-like illness    You were diagnosed today with influenza or influenza like illness.  Influenza is a respiratory illness caused by influenza A or B viruses.  Influenza causes fever, headache, muscle aches, and fatigue.  These symptoms start one to four days after you have been around a person with this illness.  People with influenza commonly have a dry cough, sore throat, and a runny nose.   Influenza is spread through sneezing and coughing and can live on surfaces for several days.  It is usually contagious for 5 days but in some cases up to 10 days and often affects several family members. If you have a family member who is less than 2 years old, older than 65 years old, pregnant or has a serious medical condition, they should be seen right away by a physician to decide if they should take preventative medications.      Return to the Emergency Department if:  You have trouble breathing  You develop pain in your chest  You have signs of being dehydrated, such as dizziness or unable to urinate at least three times daily   You feel confused  You cannot stop vomiting or you cannot drink enough fluids    In children, you should seek help if the child has any of the above or if child:  Has blue or purplish skin color  Is so irritable that he or she does not want to be held  Does not have tears when crying (in infants) or does not urinate at least three times daily  Does not wake up easily  Follow-up with your doctor if you are not improving after 5  days    What can I do to help myself?  Rest  Fluids -- Drink hydrating solutions such as Gatorade or pedialyte as often as you can. If you are drinking enough, you should pass urine at least every eight hours.  Acetaminophen and Ibuprofen (such as Tylenol  or Advil) can relieve fever, headache, and muscle aches. Do not give Aspirin to children under 18 years old.   Antiviral treatment -- Antiviral medicines do not make the flu symptoms go away immediately.  They have only been shown to make the symptoms go away 12 to 24 hours sooner than they would without treatment.     Antibiotics -- Antibiotics are NOT useful for treating viral illnesses such as influenza. Antibiotics should only used if there is a bacterial complication of the flu such as bacterial pneumonia, ear infection, or sinusitis.  Because you were diagnosed with a flu like illness you are very contagious.  This means you cannot work, attend school or  for at least 24 hours or until you no longer have a fever.    Remember that you can always come back to the Emergency Department if you are not able to see your regular doctor in the amount of time listed above, if you get any new symptoms, or if there is anything that worries you.

## 2019-02-26 ENCOUNTER — APPOINTMENT (OUTPATIENT)
Dept: ULTRASOUND IMAGING | Facility: CLINIC | Age: 19
End: 2019-02-26
Attending: NURSE PRACTITIONER
Payer: COMMERCIAL

## 2019-02-26 ENCOUNTER — HOSPITAL ENCOUNTER (EMERGENCY)
Facility: CLINIC | Age: 19
Discharge: HOME | End: 2019-02-26
Attending: NURSE PRACTITIONER | Admitting: NURSE PRACTITIONER
Payer: COMMERCIAL

## 2019-02-26 VITALS
HEIGHT: 65 IN | WEIGHT: 117 LBS | BODY MASS INDEX: 19.49 KG/M2 | OXYGEN SATURATION: 99 % | DIASTOLIC BLOOD PRESSURE: 60 MMHG | SYSTOLIC BLOOD PRESSURE: 110 MMHG | RESPIRATION RATE: 20 BRPM | TEMPERATURE: 99.1 F

## 2019-02-26 DIAGNOSIS — R53.83 LETHARGY: ICD-10-CM

## 2019-02-26 DIAGNOSIS — B96.89 BACTERIAL VAGINOSIS: ICD-10-CM

## 2019-02-26 DIAGNOSIS — N76.0 BACTERIAL VAGINOSIS: ICD-10-CM

## 2019-02-26 LAB
ALBUMIN UR-MCNC: NEGATIVE MG/DL
ANION GAP SERPL CALCULATED.3IONS-SCNC: 4 MMOL/L (ref 3–14)
APPEARANCE UR: CLEAR
BACTERIA #/AREA URNS HPF: ABNORMAL /HPF
BASOPHILS # BLD AUTO: 0 10E9/L (ref 0–0.2)
BASOPHILS NFR BLD AUTO: 0.2 %
BILIRUB UR QL STRIP: NEGATIVE
BUN SERPL-MCNC: 4 MG/DL (ref 7–30)
CALCIUM SERPL-MCNC: 9 MG/DL (ref 8.5–10.1)
CHLORIDE SERPL-SCNC: 108 MMOL/L (ref 94–109)
CO2 SERPL-SCNC: 29 MMOL/L (ref 20–32)
COLOR UR AUTO: ABNORMAL
CREAT SERPL-MCNC: 0.57 MG/DL (ref 0.52–1.04)
DIFFERENTIAL METHOD BLD: ABNORMAL
EOSINOPHIL # BLD AUTO: 0.1 10E9/L (ref 0–0.7)
EOSINOPHIL NFR BLD AUTO: 1 %
ERYTHROCYTE [DISTWIDTH] IN BLOOD BY AUTOMATED COUNT: 12.4 % (ref 10–15)
GFR SERPL CREATININE-BSD FRML MDRD: >90 ML/MIN/{1.73_M2}
GLUCOSE SERPL-MCNC: 132 MG/DL (ref 70–99)
GLUCOSE UR STRIP-MCNC: NEGATIVE MG/DL
HCG SERPL QL: NEGATIVE
HCT VFR BLD AUTO: 37 % (ref 35–47)
HGB BLD-MCNC: 12.7 G/DL (ref 11.7–15.7)
HGB UR QL STRIP: ABNORMAL
IMM GRANULOCYTES # BLD: 0 10E9/L (ref 0–0.4)
IMM GRANULOCYTES NFR BLD: 0.2 %
KETONES UR STRIP-MCNC: 10 MG/DL
LEUKOCYTE ESTERASE UR QL STRIP: NEGATIVE
LYMPHOCYTES # BLD AUTO: 1.3 10E9/L (ref 0.8–5.3)
LYMPHOCYTES NFR BLD AUTO: 11.1 %
MCH RBC QN AUTO: 29.7 PG (ref 26.5–33)
MCHC RBC AUTO-ENTMCNC: 34.3 G/DL (ref 31.5–36.5)
MCV RBC AUTO: 87 FL (ref 78–100)
MONOCYTES # BLD AUTO: 0.7 10E9/L (ref 0–1.3)
MONOCYTES NFR BLD AUTO: 5.5 %
NEUTROPHILS # BLD AUTO: 9.9 10E9/L (ref 1.6–8.3)
NEUTROPHILS NFR BLD AUTO: 82 %
NITRATE UR QL: NEGATIVE
NRBC # BLD AUTO: 0 10*3/UL
NRBC BLD AUTO-RTO: 0 /100
PH UR STRIP: 7 PH (ref 5–7)
PLATELET # BLD AUTO: 241 10E9/L (ref 150–450)
POTASSIUM SERPL-SCNC: 3.4 MMOL/L (ref 3.4–5.3)
RBC # BLD AUTO: 4.27 10E12/L (ref 3.8–5.2)
RBC #/AREA URNS AUTO: 2 /HPF (ref 0–2)
SODIUM SERPL-SCNC: 141 MMOL/L (ref 133–144)
SOURCE: ABNORMAL
SP GR UR STRIP: 1 (ref 1–1.03)
SPECIMEN SOURCE: ABNORMAL
SQUAMOUS #/AREA URNS AUTO: 1 /HPF (ref 0–1)
UROBILINOGEN UR STRIP-MCNC: NORMAL MG/DL (ref 0–2)
WBC # BLD AUTO: 12.1 10E9/L (ref 4–11)
WBC #/AREA URNS AUTO: 3 /HPF (ref 0–5)
WET PREP SPEC: ABNORMAL

## 2019-02-26 PROCEDURE — 99284 EMERGENCY DEPT VISIT MOD MDM: CPT | Mod: 25

## 2019-02-26 PROCEDURE — 25800030 ZZH RX IP 258 OP 636: Performed by: NURSE PRACTITIONER

## 2019-02-26 PROCEDURE — 96361 HYDRATE IV INFUSION ADD-ON: CPT

## 2019-02-26 PROCEDURE — 93976 VASCULAR STUDY: CPT

## 2019-02-26 PROCEDURE — 84703 CHORIONIC GONADOTROPIN ASSAY: CPT | Performed by: NURSE PRACTITIONER

## 2019-02-26 PROCEDURE — 87210 SMEAR WET MOUNT SALINE/INK: CPT | Performed by: NURSE PRACTITIONER

## 2019-02-26 PROCEDURE — 80048 BASIC METABOLIC PNL TOTAL CA: CPT | Performed by: NURSE PRACTITIONER

## 2019-02-26 PROCEDURE — 87491 CHLMYD TRACH DNA AMP PROBE: CPT | Performed by: NURSE PRACTITIONER

## 2019-02-26 PROCEDURE — 87591 N.GONORRHOEAE DNA AMP PROB: CPT | Performed by: NURSE PRACTITIONER

## 2019-02-26 PROCEDURE — 96360 HYDRATION IV INFUSION INIT: CPT

## 2019-02-26 PROCEDURE — 85025 COMPLETE CBC W/AUTO DIFF WBC: CPT | Performed by: NURSE PRACTITIONER

## 2019-02-26 PROCEDURE — 81001 URINALYSIS AUTO W/SCOPE: CPT | Performed by: NURSE PRACTITIONER

## 2019-02-26 RX ORDER — METRONIDAZOLE 500 MG/1
500 TABLET ORAL 2 TIMES DAILY
Qty: 14 TABLET | Refills: 1 | Status: SHIPPED | OUTPATIENT
Start: 2019-02-26 | End: 2019-03-05

## 2019-02-26 RX ADMIN — SODIUM CHLORIDE, POTASSIUM CHLORIDE, SODIUM LACTATE AND CALCIUM CHLORIDE 1000 ML: 600; 310; 30; 20 INJECTION, SOLUTION INTRAVENOUS at 15:28

## 2019-02-26 ASSESSMENT — MIFFLIN-ST. JEOR: SCORE: 1301.59

## 2019-02-26 ASSESSMENT — ENCOUNTER SYMPTOMS
LIGHT-HEADEDNESS: 1
CONSTIPATION: 0
VOMITING: 0
FEVER: 0
DIARRHEA: 0
FATIGUE: 1

## 2019-02-26 NOTE — ED AVS SNAPSHOT
Emergency Department  [address] [place] [address]  Phone:  [phone]  Fax:  [phone]                                    Danna Suarez   MRN: [id number]    Department:   Emergency Department   Date of Visit:  2/26/2019           After Visit Summary Signature Page    I have received my discharge instructions, and my questions have been answered. I have discussed any challenges I see with this plan with the nurse or doctor.    ..........................................................................................................................................  Patient/Patient Representative Signature      ..........................................................................................................................................  Patient Representative Print Name and Relationship to Patient    ..................................................               ................................................  Date                                   Time    ..........................................................................................................................................  Reviewed by Signature/Title    ...................................................              ..............................................  Date                                               Time          22EPIC Rev 08/18

## 2019-02-26 NOTE — ED PROVIDER NOTES
"  History     Chief Complaint:  Fatigue    HPI   Danna Suarez is a 18 year old female who presents to the ED for evaluation of fatigue. The patient reports that she was seen here in the ED yesterday by Charley Mc PA-C, for ongoing myalgias, sore throat, cough, and dehydration for the past 3 days. She had a full workup here, including a negative chest xray and labs, was given 2L of normal saline and was discharged home. Since her discharge, she has continued to feel extremely dehydrated and fatigued. Thus, she returned to the ED for evaluation. Here in the ED, she states that she has continued to have headaches, though states these are not migraines. She also notes some \"black spots\" in her vision this morning and lightheadedness which are chronic for her and are without change. She denies any fevers.     Of note, she did have a miscarriage 2 weeks ago at 5 weeks EGA, though has not had persistent vaingal bleeding. She has had some pelvic pain. She denies any diarrhea, constipation, vomiting, or other symptoms or concerns. She has no abdominal surgical history.    Workup from 2/25/19  Imaging:  Radiology findings were communicated with the patient who voiced understanding of the findings.  Chest XR,  PA & LAT   Final Result   IMPRESSION: PA and lateral views of the chest. Lungs are clear. Heart   is normal in size. No effusions are evident. No pneumothorax.       VERONICA SUAREZ MD          Laboratory:  Laboratory findings were communicated with the patient who voiced understanding of the findings.  CBC: WBC: 14.9(H), Abs neutrophil: 12.8(H) o/w WNL (HGB 12.9, )  BMP: Potassium: 3.0(L), Glucose: 113(H), Calcium: 8.4(L) o/w WNL (Creatinine 0.54)   Influenza A/B antigen: Negative  Rapid Strep Test: Negative  Strep Culture: Pending   HCG qualitative urine: Negative  UA with Microscopic: Urine ketones: 40(A), Urine blood: trace(A), Protein albumin: 10(A), WBC: 10(H), RBC: 4(H), Bacteria: moderate(A), Squamous " "epithelial: 4(H), Mucous: present(A) o/w WNL     Allergies:  NSAIDS    Medications:    Epinephrine    Past Medical History:    Anxiety  Depression  SI  Miscarriage    Past Surgical History:    The patient does not have any pertinent past surgical history.    Family History:    Depression  Substance abuse    Social History:  Marital Status:  Single [1]  Former Smoker  Alcohol use: yes  Drug use: marijuana    Review of Systems   Constitutional: Positive for fatigue. Negative for fever.   Eyes: Positive for visual disturbance.   Gastrointestinal: Negative for constipation, diarrhea and vomiting.   Genitourinary: Positive for pelvic pain. Negative for vaginal bleeding.   Neurological: Positive for light-headedness.   All other systems reviewed and are negative.    Physical Exam     Patient Vitals for the past 24 hrs:   BP Temp Temp src Heart Rate Resp SpO2 Height Weight   02/26/19 1802 110/60 -- -- 88 20 99 % -- --   02/26/19 1453 104/60 99.1  F (37.3  C) Temporal 109 16 100 % 1.651 m (5' 5\") 53.1 kg (117 lb)     Physical Exam  Nursing notes reviewed. Vitals reviewed.  General: Alert. Well kept.  Eyes:  Conjunctiva non-injected, non-icteric.  Neck/Throat: Dry mucous membranes.  Normal voice.  Cardiac: Regular rhythm. Normal heart sounds with no murmur/rubs/click.   Pulmonary: Clear and equal breath sounds bilaterally. No crackles/rales. No wheezing  Abdomen: Soft. Non-distended. Mild suprapubic tenderness to palpation. No CVA tenderness. No masses. No guarding or rebound.  Pelvic exam performed in presence of chaperone.  External genitalia without rash or sore.  Normal vaginal mucosa with clear discharge.  No CMT or adnexal tenderness.  Musculoskeletal: Normal gross range of motion of all 4 extremities.    Neurological: Alert and oriented x4.   Skin: Warm and dry without rashes or petechiae. Normal appearance of visualized exposed skin.  Psych: Affect normal. Good eye contact.    Emergency Department Course "     Imaging:  Radiographic findings were communicated with the patient who voiced understanding of the findings.  US Pelvic, Complete w Transvaginal & Abd/Pel Duplex Limited:  Unremarkable pelvic ultrasound, as per radiology.     Laboratory:  CBC: WBC: 12.1 (H), HGB: 12.7, PLT: 241  BMP: Glucose 132 (H), BUN 4 (L), o/w WNL (Creatinine: 0.57)    Chlamydia trachomatis: pending  Neisseria gonorrhoea: pending  Wet prep: clue cells seen (A), otherwise WNL    UA with Microscopic: blood trace (A), ketones 10 (A), bacteria many (A), o/w WNL  HCG: negative    Interventions:  1528 LR 1L IV    Emergency Department Course:  Nursing notes and vitals reviewed. (1259) I performed an exam of the patient as documented above.     IV inserted. Medicine administered as documented above. Blood drawn. This was sent to the lab for further testing, results above.    The patient provided a urine sample here in the emergency department. This was sent for laboratory testing, findings above.     The patient was sent for a Pelvic US while in the emergency department, findings above.     (8521) I rechecked the patient and discussed the results of her workup thus far. I performed a pelvic exam, as noted above.    Findings and plan explained to the Patient. Patient discharged home with instructions regarding supportive care, medications, and reasons to return. The importance of close follow-up was reviewed. The patient was prescribed Flagyl.    I personally reviewed the laboratory results with the Patient and answered all related questions prior to discharge.     Impression & Plan      Medical Decision Making:  Danna Suarez is a 18 year old female who presents with lethargy, as detailed above. She was seen yesterday and felt improved after 2 liters NS with negative influenza, strep and CXR.  She has only mild suprapubic tenderness and no GI symptoms and no indication for CT imaging today. US obtained and unremarkable with no signs of  endometritis or other acute findings.  She is not pregnant.  UA without signs of infection.  Based on wet prep results, patient notified that she has bacterial vaginosis which is a clinical syndrome resulting from replacement of the normal vaginal alfredo with anaerobic bacteria in high concentration. Patient was given a Rx of Flagyl 500 mg PO bid x 7 days. She was treated with IV fluids and felt resolution of her symptoms.  Patient denies mental health concerns and denies suicidal or homicidal ideation. Patient should return to the ER or emergency department for fever, cramping, vomiting, or with other questions or concerns and will otherwise follow-up with primary care in 1-2 days.    Diagnosis:    ICD-10-CM    1. Bacterial vaginosis N76.0     B96.89    2. Lethargy R53.83      Disposition:  discharged to home    Discharge Medications:     Medication List      Started    metroNIDAZOLE 500 MG tablet  Commonly known as:  FLAGYL  500 mg, Oral, 2 TIMES DAILY          Scribe Disclosure:  IKari, am serving as a scribe on 2/26/2019 at 3:10 PM to personally document services performed by Argenis Villavicencio CNP based on my observations and the provider's statements to me.     Kari Hoffman  2/26/2019    EMERGENCY DEPARTMENT       Argenis Villavicencio CNP  02/26/19 5037

## 2019-02-27 LAB
BACTERIA SPEC CULT: NORMAL
C TRACH DNA SPEC QL NAA+PROBE: NEGATIVE
N GONORRHOEA DNA SPEC QL NAA+PROBE: NEGATIVE
SPECIMEN SOURCE: NORMAL

## 2019-02-27 NOTE — RESULT ENCOUNTER NOTE
Final Beta strep group A r/o culture is NEGATIVE for Group A streptococcus.    No treatment or change in treatment per [facility] Strep protocol.

## 2019-04-29 ENCOUNTER — HOSPITAL ENCOUNTER (EMERGENCY)
Facility: CLINIC | Age: 19
Discharge: HOME | End: 2019-04-29
Admitting: EMERGENCY MEDICINE
Payer: MEDICAID

## 2019-04-29 VITALS
SYSTOLIC BLOOD PRESSURE: 114 MMHG | RESPIRATION RATE: 18 BRPM | TEMPERATURE: 98.7 F | OXYGEN SATURATION: 99 % | HEIGHT: 65 IN | DIASTOLIC BLOOD PRESSURE: 67 MMHG | WEIGHT: 107 LBS | BODY MASS INDEX: 17.83 KG/M2 | HEART RATE: 85 BPM

## 2019-04-29 DIAGNOSIS — K08.89 PAIN, DENTAL: ICD-10-CM

## 2019-04-29 DIAGNOSIS — J02.9 SORE THROAT: ICD-10-CM

## 2019-04-29 LAB
DEPRECATED S PYO AG THROAT QL EIA: NORMAL
SPECIMEN SOURCE: NORMAL

## 2019-04-29 PROCEDURE — 87081 CULTURE SCREEN ONLY: CPT | Performed by: PHYSICIAN ASSISTANT

## 2019-04-29 PROCEDURE — 99283 EMERGENCY DEPT VISIT LOW MDM: CPT

## 2019-04-29 PROCEDURE — 25000128 H RX IP 250 OP 636: Performed by: PHYSICIAN ASSISTANT

## 2019-04-29 PROCEDURE — 87880 STREP A ASSAY W/OPTIC: CPT | Performed by: PHYSICIAN ASSISTANT

## 2019-04-29 RX ORDER — DEXAMETHASONE SODIUM PHOSPHATE 10 MG/ML
10 INJECTION, SOLUTION INTRAMUSCULAR; INTRAVENOUS ONCE
Status: COMPLETED | OUTPATIENT
Start: 2019-04-29 | End: 2019-04-29

## 2019-04-29 RX ORDER — PENICILLIN V POTASSIUM 500 MG/1
500 TABLET, FILM COATED ORAL 4 TIMES DAILY
Qty: 20 TABLET | Refills: 0 | Status: SHIPPED | OUTPATIENT
Start: 2019-04-29 | End: 2019-05-04

## 2019-04-29 RX ADMIN — DEXAMETHASONE SODIUM PHOSPHATE 10 MG: 10 INJECTION, SOLUTION INTRAMUSCULAR; INTRAVENOUS at 13:35

## 2019-04-29 ASSESSMENT — ENCOUNTER SYMPTOMS
COUGH: 0
TROUBLE SWALLOWING: 1
SORE THROAT: 1

## 2019-04-29 ASSESSMENT — MIFFLIN-ST. JEOR: SCORE: 1256.23

## 2019-04-29 NOTE — DISCHARGE INSTRUCTIONS
Begin taking the antibiotic as prescribed.  If your right tonsil becomes more swollen or the uvula (dangle anything in the back your throat) moves to the left side of your throat, return for further evaluation.  Tylenol, ibuprofen, hot tea with honey, lozenges at home to help with pain.  Follow-up with a dentist at the earliest appointment.    Many of these clinics offer a sliding fee option for patients that qualify, and see patients on a walk-in or same day basis. Please call each clinic directly. As services, hours, fees and policies vary greatly.    [place]:  [facility]     [phone]  [facility] ([facility]) [phone]  [facility]  [phone]  [facility]      [phone]   [facility]    [phone]  [facility]  [phone]  [facility]) [phone]  [facility]     [phone]  [facility]   [phone]  [facility] (cash only)   [phone]  [facility]    [phone] (adults)          [phone] (children)    [place]:  [facility]     [phone]; [phone]  [facility]     [phone]  [facility]     [phone]  [facility] (free, limited)    [phone]    Multiple Locations:  [facility]       [phone]    Discharge Instructions  Sore Throat  You were seen today for a sore throat.  Most sore throats are caused by a virus. Antibiotics do not help with viral infections, but you can fight off the virus on your own.  In this case, your sore throat would be treated with medications for your pain and fever.    Strep throat is a kind of sore throat caused by Group A streptococcus bacteria.  This type of sore throat is treated with antibiotics.  If you had a rapid test done today for  "strep throat and it did not show infection, we always do a culture. If the culture shows you have strep throat, we will call you and get you a prescription for antibiotics.    Return to the Emergency Department if:  If you have difficulty breathing.  If you are drooling because you are unable to swallow.  You become dehydrated due to difficulty drinking. Signs of dehydration include weakness, dry mouth, and urinating less than 3 times per day.  If you develop swelling of the neck or tongue.  If you develop a high fever with either headache or stiff neck.    Treatment:    Pain relief -- Non-prescription pain medications, such as Tylenol  (acetaminophen) or Motrin , Advil  (ibuprofen) are usually recommended for pain.  Do not use a medicine that you are allergic to, or if your doctor has told you not to use it.   If you have been given a narcotic such as Vicodin  (hydrocodone with acetaminophen), Percocet  (oxycodone with acetaminophen), codeine, do not drive for four hours after you have taken it.  If the narcotic contains Tylenol  (acetaminophen), do not take Tylenol  with it. All narcotics will cause constipation, so eat a high fiber diet.    If you have been placed on antibiotics, watch for signs of allergic reaction.  These include rash, lip swelling, difficulty breathing, wheezing, and dizziness.  If you develop any of these symptoms, stop the antibiotic immediately and go to an Emergency Department or Urgent Care for evaluation.    Probiotics: If you have been given an antibiotic, you may want to also take a probiotic pill or eat yogurt with live cultures. Probiotics have \"good bacteria\" to help your intestines stay healthy. Studies have shown that probiotics help prevent diarrhea and other intestine problems (including C. diff infection) when you take antibiotics. You can buy these without a prescription in the pharmacy section of the store.   If you were given a prescription for medicine here today, be sure " to read all of the information (including the package insert) that comes with your prescription.  This will include important information about the medicine, its side effects, and any warnings that you need to know about.  The pharmacist who fills the prescription can provide more information and answer questions you may have about the medicine.  If you have questions or concerns that the pharmacist cannot address, please call or return to the Emergency Department.     Discharge Instructions  Dental Pain    You have been seen today for a toothache. Your pain may be caused by an exposed nerve, an infection (pulpitis), a root abscess, or other problems. You will need to see a dentist for a solution to your tooth problem. Emergency Department care is only to help control your problem until you can see a dentist.  Today, we did not find any sign that your toothache was caused by a serious condition, but sometimes symptoms develop over time and cannot be found during an emergency visit, so it is very important that you follow up with your dentist.      Return to the Emergency Department if:  You develop a fever over 101 degrees Fahrenheit.  You can?t open your mouth normally, can?t move your tongue well, or can?t swallow.  You have new or increased swelling of your face or neck.  You develop drainage of pus or foul smelling material from around your tooth.  What can I do to help myself?  Take any antibiotic the doctor may have prescribed for you today.  Avoid very hot or very cold foods as both can cause pain.  Make an appointment to see a dentist as soon as possible. If you wish, we can provide you with a list of low-cost dental clinics.   If you were given a prescription for medicine here today, be sure to read all of the information (including the package insert) that comes with your prescription.  This will include important information about the medicine, its side effects, and any warnings that you need to know  about.  The pharmacist who fills the prescription can provide more information and answer questions you may have about the medicine.  If you have questions or concerns that the pharmacist cannot address, please call or return to the Emergency Department.

## 2019-04-29 NOTE — ED PROVIDER NOTES
"  History     Chief Complaint:  Dental Pain and sore throat    HPI   Danna Suarez is a 18 year old female who presents with right sided lower tooth pain which began one week ago. Pain subsided with 1000 mg Tylenol. Two days ago she developed some right ear pain, right-sided throat pain and difficultly swallowing. Today she rates her ear pain as an 8/10 on the pain scale, her tooth pain as a 7 or 8/10 and throat pain as a 8/10. She does not remember cracking her tooth but she notes she had lots of Easter candy recently. She has not measured her temperature. She had strep throat frequently as a child, the last of which was a couple of years ago. She had root canals and multiple cavities in the past. Patient has no sick contacts. Patient denies cough.     Allergies:  NSAIDs     Medications:    EpiPen     Past Medical History:    Anxiety   Depression    Past Surgical History:    History reviewed. No pertinent past surgical history.    Family History:    Anxiety  Depression  Substance abuse     Social History:  Presents to the ED alone.   Tobacco Use: Former smoker (0.50 ppd)  Alcohol Use: Socially   PCP: [facility]  Marital Status:  Single [1]    Review of Systems   HENT: Positive for dental problem, ear pain, sore throat and trouble swallowing.    Respiratory: Negative for cough.    All other systems reviewed and are negative.    Physical Exam   First Vitals:  Patient Vitals for the past 24 hrs:   BP Temp Temp src Pulse Resp SpO2 Height Weight   04/29/19 1244 114/67 98.7  F (37.1  C) Oral 109 16 99 % 1.651 m (5' 5\") 48.5 kg (107 lb)       Physical Exam   General: Well appearing, well nourished. Normal mood and affect.  Skin: Good turgor, no rash, no unusual bruising or prominent lesions.  HEENT: Head: Normocephalic, atraumatic, no visible masses. No facial swelling.   Eyes: Conjunctiva clear.  Ears: EACs clear, TMs translucent. No tenderness with palpation of the pinna or tragus. No mastoid " tenderness.   Nose: No external lesions, mucosa non-inflamed, septum and turbinates normal.   Throat/pharynx: Tonsillar hypertrophy bilaterally, right sided more than the left. Uvula midline. No exudate. Mucous membranes moist.   Teeth: No tenderness with palpation throughout teeth. No evidence of erythema, edema, or fluctuance along gum line to suggest abscess. Tooth #32 erupting from gumline.   Neck: Supple, with mild cervical lymphadenopathy.   Cardiac: Normal rate and regular rhythm, no murmur or gallop.   Lungs: Clear to auscultation.  Musculoskeletal: Normal gait and station.  Neurologic: Oriented x 3. GCS: 15.  Psychiatric: Intact recent and remote memory, judgment and insight, normal mood and affect.     Emergency Department Course     Laboratory:  RSS: Negative  Strep culture: Pending    Interventions:  1335: Decadron, 10 mg, oral    Emergency Department Course:  The patient arrived in triage where vitals were measured and recorded.   The patient was then escorted back to the emergency department.   The patient's medical records were reviewed.  Nursing notes and vitals were reviewed.  1327: I performed an exam of the patient as documented above.  The above workup was undertaken.  1400: I rechecked the patient and discussed results.  Findings and plan explained to the Patient. Patient discharged home, status improved, with instructions regarding supportive care, medications, and reasons to return as well as the importance of close follow-up was reviewed. Patient was prescribed Veetid.     Impression & Plan      Medical Decision Making:  Danna Suarez is a 18 year old female who presented to the ED with jaw pain/dental pain.  There is no abscess detected around the tooth amenable to incision and drainage.  The differential diagnosis includes: cracked tooth syndrome, pulpitis, sub-apical abscess, amongst others.  There is no evidence of buccinator/canine space infections, significant facial swelling, or  Alonso's angina. Patient had no reproducible dental discomfort on my exam.  Dental block was not completed.  Follow up with a dentist/endodontist in the coming days is indicated for further work up and treatment.  List of local dental resources provided to patient. The patient will be placed on Penicillin VK for infection prophylaxis.They will continue Tylenol, ibuprofen, heat at home to help with discomfort. They will return to the ED for worsening/changing pain, increased swelling around the tooth, facial swelling, fever >101F, new concerns.     She also presented with sore throat and clinical evidence of pharyngitis.  The rapid strep test is negative, and formal culture has been set up in the lab.  There was some mild right tonsillar hypertrophy that was greater than the left.  However, uvula was midline.  Low concern for PTA at this time, however strict follow-up was advised.  There is no clinical evidence of  peritonsillar abscess, retropharyngeal abscess, Lemierre's Syndrome, epiglottis, or Alonso's angina. The etiology is most likely viral.  The patient's symptoms are consistent with viral pharyngitis.  I have recommended treatment with analgesics, and we will await formal culture results.  Her throat was sprayed with 4% lidocaine hydrochloride topical solution to help with pain. Return if increasing pain, change in voice, neck pain, vomiting, fever, or shortness of breath. Follow-up with primary physician if not improving in 3-5 days. All questions were answered prior to the patient's discharge. She was in agreement with the plan stated above.       Diagnosis:    ICD-10-CM    1. Sore throat J02.9    2. Pain, dental K08.89        Disposition:  Discharged to home.     Discharge Medications:     Medication List      Started    penicillin V 500 MG tablet  Commonly known as:  VEETID  500 mg, Oral, 4 TIMES DAILY              Hope MELÉNDEZ am serving as a scribe on 4/29/2019 at 1:27 PM to personally document  services performed by Tonya Sheffield PA-C, based on my observations and the provider's statements to me.    EMERGENCY DEPARTMENT      This was created at least in part with a voice recognition software. Mistakes/typos may be present.        Tonya Sheffield PA  04/29/19 1414

## 2019-04-29 NOTE — ED AVS SNAPSHOT
Emergency Department  [address] [place] [address]  Phone:  [phone]  Fax:  [phone]                                    Danna Suarez   MRN: [id number]    Department:   Emergency Department   Date of Visit:  4/29/2019           After Visit Summary Signature Page    I have received my discharge instructions, and my questions have been answered. I have discussed any challenges I see with this plan with the nurse or doctor.    ..........................................................................................................................................  Patient/Patient Representative Signature      ..........................................................................................................................................  Patient Representative Print Name and Relationship to Patient    ..................................................               ................................................  Date                                   Time    ..........................................................................................................................................  Reviewed by Signature/Title    ...................................................              ..............................................  Date                                               Time          22EPIC Rev 08/18

## 2019-04-30 NOTE — RESULT ENCOUNTER NOTE
Preliminary Beta strep group A r/o culture is PENDING and/or NEGATIVE at this time.   No changes in treatment per [facility] Strep protocol.

## 2019-05-01 LAB
BACTERIA SPEC CULT: NORMAL
Lab: NORMAL
SPECIMEN SOURCE: NORMAL

## 2019-05-01 NOTE — RESULT ENCOUNTER NOTE
Final Beta strep group A r/o culture is NEGATIVE for Group A streptococcus.    No treatment or change in treatment per [facility] Strep protocol.

## 2020-03-05 ENCOUNTER — APPOINTMENT (OUTPATIENT)
Dept: GENERAL RADIOLOGY | Facility: CLINIC | Age: 20
End: 2020-03-05
Attending: EMERGENCY MEDICINE
Payer: COMMERCIAL

## 2020-03-05 ENCOUNTER — HOSPITAL ENCOUNTER (EMERGENCY)
Facility: CLINIC | Age: 20
Discharge: HOME | End: 2020-03-05
Attending: PHYSICIAN ASSISTANT | Admitting: PHYSICIAN ASSISTANT
Payer: COMMERCIAL

## 2020-03-05 VITALS
HEIGHT: 65 IN | SYSTOLIC BLOOD PRESSURE: 99 MMHG | TEMPERATURE: 99 F | OXYGEN SATURATION: 99 % | DIASTOLIC BLOOD PRESSURE: 59 MMHG | RESPIRATION RATE: 18 BRPM | HEART RATE: 104 BPM | BODY MASS INDEX: 17.81 KG/M2

## 2020-03-05 DIAGNOSIS — R09.81 NASAL CONGESTION: ICD-10-CM

## 2020-03-05 DIAGNOSIS — R06.00 ACUTE DYSPNEA: ICD-10-CM

## 2020-03-05 LAB
BASOPHILS # BLD AUTO: 0 10E9/L (ref 0–0.2)
BASOPHILS NFR BLD AUTO: 0.4 %
D DIMER PPP FEU-MCNC: 0.3 UG/ML FEU (ref 0–0.5)
DIFFERENTIAL METHOD BLD: NORMAL
EOSINOPHIL # BLD AUTO: 0.2 10E9/L (ref 0–0.7)
EOSINOPHIL NFR BLD AUTO: 2.1 %
ERYTHROCYTE [DISTWIDTH] IN BLOOD BY AUTOMATED COUNT: 12.3 % (ref 10–15)
FLUAV+FLUBV AG SPEC QL: NEGATIVE
FLUAV+FLUBV AG SPEC QL: NEGATIVE
HCT VFR BLD AUTO: 40.6 % (ref 35–47)
HGB BLD-MCNC: 13.9 G/DL (ref 11.7–15.7)
IMM GRANULOCYTES # BLD: 0 10E9/L (ref 0–0.4)
IMM GRANULOCYTES NFR BLD: 0.1 %
LYMPHOCYTES # BLD AUTO: 1.6 10E9/L (ref 0.8–5.3)
LYMPHOCYTES NFR BLD AUTO: 20.4 %
MCH RBC QN AUTO: 30.2 PG (ref 26.5–33)
MCHC RBC AUTO-ENTMCNC: 34.2 G/DL (ref 31.5–36.5)
MCV RBC AUTO: 88 FL (ref 78–100)
MONOCYTES # BLD AUTO: 0.6 10E9/L (ref 0–1.3)
MONOCYTES NFR BLD AUTO: 8 %
NEUTROPHILS # BLD AUTO: 5.3 10E9/L (ref 1.6–8.3)
NEUTROPHILS NFR BLD AUTO: 69 %
NRBC # BLD AUTO: 0 10*3/UL
NRBC BLD AUTO-RTO: 0 /100
PLATELET # BLD AUTO: 374 10E9/L (ref 150–450)
RBC # BLD AUTO: 4.6 10E12/L (ref 3.8–5.2)
SPECIMEN SOURCE: NORMAL
TROPONIN I SERPL-MCNC: <0.015 UG/L (ref 0–0.04)
WBC # BLD AUTO: 7.6 10E9/L (ref 4–11)

## 2020-03-05 PROCEDURE — 71046 X-RAY EXAM CHEST 2 VIEWS: CPT

## 2020-03-05 PROCEDURE — 84484 ASSAY OF TROPONIN QUANT: CPT | Performed by: PHYSICIAN ASSISTANT

## 2020-03-05 PROCEDURE — 93005 ELECTROCARDIOGRAM TRACING: CPT

## 2020-03-05 PROCEDURE — 85025 COMPLETE CBC W/AUTO DIFF WBC: CPT | Performed by: PHYSICIAN ASSISTANT

## 2020-03-05 PROCEDURE — 87804 INFLUENZA ASSAY W/OPTIC: CPT | Performed by: EMERGENCY MEDICINE

## 2020-03-05 PROCEDURE — 85379 FIBRIN DEGRADATION QUANT: CPT | Performed by: PHYSICIAN ASSISTANT

## 2020-03-05 PROCEDURE — 99285 EMERGENCY DEPT VISIT HI MDM: CPT | Mod: 25

## 2020-03-05 RX ORDER — ALBUTEROL SULFATE 90 UG/1
2 AEROSOL, METERED RESPIRATORY (INHALATION) EVERY 4 HOURS PRN
Qty: 1 INHALER | Refills: 0 | Status: SHIPPED | OUTPATIENT
Start: 2020-03-05

## 2020-03-05 NOTE — DISCHARGE INSTRUCTIONS
Use Inhaler.   Follow up with primary care.   Return here for new or worsening SOB, chest pain, fevers/chills.     Discharge Instructions  Upper Respiratory Infection    The upper respiratory tract includes the sinuses, nasal passages, pharynx, and larynx. A URI, or upper respiratory infection, is an infection of any of the parts of the upper airway. Symptoms include runny nose, congestion, sneezing, sore throat, cough, and fever. URIs are almost always caused by a virus. Antibiotics do not help with viral infections, so are generally not prescribed. A URI is very contagious through coughing and nasal secretions; make sure you wash your hands often and clean surfaces after sneezing, coughing or touching them. While you should start to improve in 3 - 5 days, remember that sometimes a cough can linger for several weeks.    Generally, every Emergency Department visit should have a follow-up clinic visit with either a primary or a specialty clinic/provider. Please follow-up as instructed by your emergency provider today.    Return to the Emergency Department if:  Any of your symptoms get much worse.  You seem very sick, like being too weak to get up.  You have chest pain or shortness of breath.   You have a severe headache.  You are vomiting (throwing up) so much you cannot keep fluids or medicines down.  You have confusion or seem unusually drowsy.  You have a seizure.    What can I do to help myself?  Fill any prescriptions the provider gave you and take them right away  If you have a fever, get plenty of rest and drink lots of fluids, especially water.  Using a humidifier or saline nose spray will also help loosen mucous.   Clothes or blankets will not change your fever. Do what is comfortable for you.  Bathing or sponging in lukewarm water may help you feel better.  Acetaminophen (Tylenol ) or ibuprofen (Advil , Motrin ) will help bring fever down and may help you feel more comfortable. Be sure to read and follow  the package directions, and ask your provider if you have questions.  Do not drink alcohol.  Decongestants may help you feel better. You may use decongestant nose sprays Afrin  (oxymetazoline) or Massimo-Synephrine  (phenylephrine hydrochloride) for up to 3 days, or may use a decongestant tablet like Sudafed  (pseudoephedrine).  If you were given a prescription for medicine here today, be sure to read all of the information (including the package insert) that comes with your prescription.  This will include important information about the medicine, its side effects, and any warnings that you need to know about.  The pharmacist who fills the prescription can provide more information and answer questions you may have about the medicine.  If you have questions or concerns that the pharmacist cannot address, please call or return to the Emergency Department.   Remember that you can always come back to the Emergency Department if you are not able to see your regular provider in the amount of time listed above, if you get any new symptoms, or if there is anything that worries you.

## 2020-03-05 NOTE — ED AVS SNAPSHOT
Emergency Department  [address] [facility] [address]  Phone:  [phone]  Fax:  [phone]                                    Danna Suarez   MRN: [id number]    Department:   Emergency Department   Date of Visit:  3/5/2020           After Visit Summary Signature Page    I have received my discharge instructions, and my questions have been answered. I have discussed any challenges I see with this plan with the nurse or doctor.    ..........................................................................................................................................  Patient/Patient Representative Signature      ..........................................................................................................................................  Patient Representative Print Name and Relationship to Patient    ..................................................               ................................................  Date                                   Time    ..........................................................................................................................................  Reviewed by Signature/Title    ...................................................              ..............................................  Date                                               Time          22EPIC Rev 08/18

## 2020-03-06 NOTE — ED PROVIDER NOTES
"  History     Chief Complaint:  Shortness of Breath    HPI   Danna Suarez is a 19 year old female who presents with exertional shortness of breath ever since walking up stairs while voting two days ago. She admits to some nasal congestion but has no other symptoms. She notes anaphylaxis to NSAIDs but has not taking any recently. She does vaporize nicotine but has not vaporized any marijuana. She admits to a cough for the past two weeks and the sensation that she feels hot and cold, but she has not recorded a fever. She admits to intermittent nausea but no abdominal pain, vomiting, headaches, body aches. She has a distant history of pneumonia but no history of asthma. Mother states family history of heart complications and PE/DVT.     Cardiac/PE/DVT Risk Factors:  History of hypertension - N  History of hyperlipidemia - N  History of diabetes - N  History of smoking - P, vapes  Personal history of PE/DVT - N  Family history of PE/DVT - P  Family history of heart complications - P  Recent travel - N  Recent surgery - N  Other immobilizations - N  Cancer - N    Allergies:  NSAIDs    Medications:    Epipen     Past Medical History:    Anxiety   Depression    Past Surgical History:    No pertinent past surgical history.     Family History:    Family History   Problem Relation Age of Onset     Parkinsonism Maternal Grandmother      Bipolar Disorder Maternal Grandmother      Depression Mother      Substance Abuse Father      Unknown/Adopted Father      Depression Father         minimal contact with \"outside world\"     Substance Abuse Maternal Grandfather      Depression Maternal Grandfather      Depression Paternal Grandmother      Depression Paternal Grandfather      Substance Abuse Paternal Grandfather      Depression Brother      Anxiety Disorder Brother      Substance Abuse Brother         not on a regular basis     Bipolar Disorder Maternal Aunt      Substance Abuse Maternal Aunt      Bipolar Disorder Maternal " "Aunt      Substance Abuse Maternal Aunt      Bipolar Disorder Maternal Aunt      Substance Abuse Paternal Uncle      Unknown/Adopted Paternal Uncle      Social History:  Marital Status:  Single  Current smokeless tobacco  Former cigarette smoker  Occasional marijuana use    Review of Systems  Ten review of systems negative, apart from what is noted above in HPI.       Physical Exam   Vitals:  Patient Vitals for the past 24 hrs:   BP Temp Temp src Pulse Resp SpO2 Height   03/05/20 1526 99/59 99  F (37.2  C) Temporal 104 18 99 % 1.651 m (5' 5\")     Physical Exam  General: Alert and interactive. Appears well. Cooperative and pleasant.   Eyes: The pupils are equal and round. EOMs intact. No scleral icterus.  ENT: No abnormalities to the external nose or ears. Mucous membranes moist. Posterior oropharynx is non-erythematous.      Neck: Trachea is in the midline. No nuchal rigidity.     CV: Regular rate and rhythm. S1 and S2 normal without murmur, click, gallop or rub.   Resp: Breath sounds are clear bilaterally, without rhonchi, wheezes, rales. Non-labored, no retractions or accessory muscle use.     GI: Abdomen is soft without distension. No tenderness to palpation. No peritoneal signs.    MS: Moving all extremities well. Good muscle tone.   Skin: Warm and dry. No rash or lesions noted.  Neuro: Alert and oriented x 3. No focal neurologic deficits. Good strength and sensation in upper and lower extremities.    Psych: Awake. Alert.  Normal affect. Appropriate interactions.  Lymph: No anterior or posterior cervical lymphadenopathy noted.    Emergency Department Course     ECG:  Indication: Dyspnea  Completed at 1618.  Read at 1620.   Rate 92 bpm. VA interval 132 ms. QRS duration 84 ms. QT/QTc 342/422. P-R-T axes 75 86 50.  Normal sinus rhythm with sinus arrhythmia. Interpreted by Dr. Kimball.     Imaging:  Chest XR,  PA & LAT   Final Result   IMPRESSION: Since February 25, 2019, heart size is normal. No pleural   effusion, " pneumothorax, or abnormal area of consolidation.      KASI GONZALEZ MD        Laboratory:  Influenza A/B:Negative for Influenza A, Negative for Influenza B  D-Dimer: 0.3  Troponin I:<0.015  CBC: WNL. (WBC 7.4, HGB 13.9, )     Emergency Department Course:  Past medical records, nursing notes, and vitals reviewed.   I performed an exam of the patient as documented above.   The above imaging and labs were obtained. Results above.  I rechecked patient.  I discussed the treatment plan with the patient and mother, who expressed understanding of this plan and consented to discharge. Patient will be discharged home with instructions for care and follow up. In addition, the patient will return to the emergency department if symptoms persist, worsen, if new symptoms arise or if there is any concern.  All questions were answered.    Impression & Plan      Medical Decision Making:  Danna Suarez is a 19 year old female who presents for evaluation of shortness of breath while walking up stairs. The workup in the Emergency Department is negative and reassuring. The differential is broad, including ACS, PE, cardiac tamponade, aortic dissection, pneumonia, pneumothorax, anemia, asthma, and others.     EKG shows NSR without signs of ischemia and infarction and troponin negative, and thus I doubt ACS. Dimer is negative, and I doubt PE. Additionally, the patient has no signs of tachypnea, tachycardia, or hypoxia on exam. No signs of leukocytosis, anemia; chest x-ray shows no signs of pneumonia, pneumothorax, or pleural effusions.     The patient has a HEART score of 1, and I believe she is stable for outpatient follow up. Suggested albuterol inhaler and close follow up with PCP to further discuss symptoms. Stable for outpatient follow up.     Diagnosis:    ICD-10-CM    1. Nasal congestion R09.81    2. Acute dyspnea R06.00        Disposition:  Discharged to home    Discharge Medications:  Discharge Medication List as of  3/5/2020  5:01 PM      START taking these medications    Details   albuterol (PROAIR HFA) 108 (90 Base) MCG/ACT inhaler Inhale 2 puffs into the lungs every 4 hours as needed for shortness of breath / dyspnea, Disp-1 Inhaler, R-0, Local Print             Alejandra Segovia PA-C  3/5/2020    EMERGENCY DEPARTMENT       Alejandra Segovia PA-C  03/06/20 0967

## 2020-03-10 LAB — INTERPRETATION ECG - MUSE: NORMAL

## 2020-03-23 ENCOUNTER — HOSPITAL ENCOUNTER (EMERGENCY)
Facility: CLINIC | Age: 20
Discharge: HOME | End: 2020-03-23
Attending: EMERGENCY MEDICINE | Admitting: EMERGENCY MEDICINE
Payer: COMMERCIAL

## 2020-03-23 VITALS
SYSTOLIC BLOOD PRESSURE: 110 MMHG | RESPIRATION RATE: 20 BRPM | HEART RATE: 94 BPM | BODY MASS INDEX: 17.47 KG/M2 | OXYGEN SATURATION: 100 % | DIASTOLIC BLOOD PRESSURE: 85 MMHG | TEMPERATURE: 98 F | WEIGHT: 105 LBS

## 2020-03-23 DIAGNOSIS — R19.7 DIARRHEA, UNSPECIFIED TYPE: ICD-10-CM

## 2020-03-23 DIAGNOSIS — R11.2 NON-INTRACTABLE VOMITING WITH NAUSEA, UNSPECIFIED VOMITING TYPE: ICD-10-CM

## 2020-03-23 LAB
ALBUMIN SERPL-MCNC: 4.5 G/DL (ref 3.4–5)
ALBUMIN UR-MCNC: 10 MG/DL
ALP SERPL-CCNC: 81 U/L (ref 40–150)
ALT SERPL W P-5'-P-CCNC: 19 U/L (ref 0–50)
ANION GAP SERPL CALCULATED.3IONS-SCNC: 7 MMOL/L (ref 3–14)
APPEARANCE UR: CLEAR
AST SERPL W P-5'-P-CCNC: 18 U/L (ref 0–45)
BASOPHILS # BLD AUTO: 0 10E9/L (ref 0–0.2)
BASOPHILS NFR BLD AUTO: 0.1 %
BILIRUB SERPL-MCNC: 1 MG/DL (ref 0.2–1.3)
BILIRUB UR QL STRIP: NEGATIVE
BUN SERPL-MCNC: 14 MG/DL (ref 7–30)
CALCIUM SERPL-MCNC: 9.7 MG/DL (ref 8.5–10.1)
CHLORIDE SERPL-SCNC: 107 MMOL/L (ref 94–109)
CO2 SERPL-SCNC: 22 MMOL/L (ref 20–32)
COLOR UR AUTO: YELLOW
CREAT SERPL-MCNC: 0.61 MG/DL (ref 0.52–1.04)
DIFFERENTIAL METHOD BLD: ABNORMAL
EOSINOPHIL # BLD AUTO: 0.2 10E9/L (ref 0–0.7)
EOSINOPHIL NFR BLD AUTO: 1.4 %
ERYTHROCYTE [DISTWIDTH] IN BLOOD BY AUTOMATED COUNT: 12.4 % (ref 10–15)
GFR SERPL CREATININE-BSD FRML MDRD: >90 ML/MIN/{1.73_M2}
GLUCOSE SERPL-MCNC: 102 MG/DL (ref 70–99)
GLUCOSE UR STRIP-MCNC: NEGATIVE MG/DL
HCG UR QL: NEGATIVE
HCT VFR BLD AUTO: 42.9 % (ref 35–47)
HGB BLD-MCNC: 14.6 G/DL (ref 11.7–15.7)
HGB UR QL STRIP: ABNORMAL
IMM GRANULOCYTES # BLD: 0 10E9/L (ref 0–0.4)
IMM GRANULOCYTES NFR BLD: 0.2 %
KETONES UR STRIP-MCNC: 10 MG/DL
LEUKOCYTE ESTERASE UR QL STRIP: ABNORMAL
LIPASE SERPL-CCNC: 113 U/L (ref 73–393)
LYMPHOCYTES # BLD AUTO: 0.5 10E9/L (ref 0.8–5.3)
LYMPHOCYTES NFR BLD AUTO: 2.8 %
MCH RBC QN AUTO: 29.6 PG (ref 26.5–33)
MCHC RBC AUTO-ENTMCNC: 34 G/DL (ref 31.5–36.5)
MCV RBC AUTO: 87 FL (ref 78–100)
MONOCYTES # BLD AUTO: 1.3 10E9/L (ref 0–1.3)
MONOCYTES NFR BLD AUTO: 7.4 %
MUCOUS THREADS #/AREA URNS LPF: PRESENT /LPF
NEUTROPHILS # BLD AUTO: 15.5 10E9/L (ref 1.6–8.3)
NEUTROPHILS NFR BLD AUTO: 88.1 %
NITRATE UR QL: NEGATIVE
NRBC # BLD AUTO: 0 10*3/UL
NRBC BLD AUTO-RTO: 0 /100
PH UR STRIP: 5.5 PH (ref 5–7)
PLATELET # BLD AUTO: 317 10E9/L (ref 150–450)
POTASSIUM SERPL-SCNC: 3.7 MMOL/L (ref 3.4–5.3)
PROT SERPL-MCNC: 8.2 G/DL (ref 6.8–8.8)
RBC # BLD AUTO: 4.93 10E12/L (ref 3.8–5.2)
RBC #/AREA URNS AUTO: 33 /HPF (ref 0–2)
SODIUM SERPL-SCNC: 136 MMOL/L (ref 133–144)
SOURCE: ABNORMAL
SP GR UR STRIP: 1.03 (ref 1–1.03)
SQUAMOUS #/AREA URNS AUTO: 7 /HPF (ref 0–1)
UROBILINOGEN UR STRIP-MCNC: NORMAL MG/DL (ref 0–2)
WBC # BLD AUTO: 17.6 10E9/L (ref 4–11)
WBC #/AREA URNS AUTO: 3 /HPF (ref 0–5)

## 2020-03-23 PROCEDURE — 81025 URINE PREGNANCY TEST: CPT | Performed by: EMERGENCY MEDICINE

## 2020-03-23 PROCEDURE — 25000128 H RX IP 250 OP 636: Performed by: EMERGENCY MEDICINE

## 2020-03-23 PROCEDURE — 25800030 ZZH RX IP 258 OP 636: Performed by: EMERGENCY MEDICINE

## 2020-03-23 PROCEDURE — 85025 COMPLETE CBC W/AUTO DIFF WBC: CPT | Performed by: EMERGENCY MEDICINE

## 2020-03-23 PROCEDURE — 80053 COMPREHEN METABOLIC PANEL: CPT | Performed by: EMERGENCY MEDICINE

## 2020-03-23 PROCEDURE — 36415 COLL VENOUS BLD VENIPUNCTURE: CPT | Performed by: EMERGENCY MEDICINE

## 2020-03-23 PROCEDURE — 99284 EMERGENCY DEPT VISIT MOD MDM: CPT | Mod: 25

## 2020-03-23 PROCEDURE — 81001 URINALYSIS AUTO W/SCOPE: CPT | Performed by: EMERGENCY MEDICINE

## 2020-03-23 PROCEDURE — 96374 THER/PROPH/DIAG INJ IV PUSH: CPT

## 2020-03-23 PROCEDURE — 96361 HYDRATE IV INFUSION ADD-ON: CPT

## 2020-03-23 PROCEDURE — 83690 ASSAY OF LIPASE: CPT | Performed by: EMERGENCY MEDICINE

## 2020-03-23 RX ORDER — HYDROMORPHONE HYDROCHLORIDE 1 MG/ML
0.5 INJECTION, SOLUTION INTRAMUSCULAR; INTRAVENOUS; SUBCUTANEOUS
Status: DISCONTINUED | OUTPATIENT
Start: 2020-03-23 | End: 2020-03-23 | Stop reason: HOSPADM

## 2020-03-23 RX ORDER — ONDANSETRON 4 MG/1
4 TABLET, FILM COATED ORAL EVERY 8 HOURS PRN
Qty: 6 TABLET | Refills: 0 | Status: SHIPPED | OUTPATIENT
Start: 2020-03-23

## 2020-03-23 RX ORDER — ONDANSETRON 2 MG/ML
4 INJECTION INTRAMUSCULAR; INTRAVENOUS EVERY 30 MIN PRN
Status: DISCONTINUED | OUTPATIENT
Start: 2020-03-23 | End: 2020-03-23 | Stop reason: HOSPADM

## 2020-03-23 RX ADMIN — SODIUM CHLORIDE 1000 ML: 9 INJECTION, SOLUTION INTRAVENOUS at 10:48

## 2020-03-23 RX ADMIN — ONDANSETRON 4 MG: 2 INJECTION INTRAMUSCULAR; INTRAVENOUS at 10:50

## 2020-03-23 RX ADMIN — SODIUM CHLORIDE 1000 ML: 9 INJECTION, SOLUTION INTRAVENOUS at 11:33

## 2020-03-23 ASSESSMENT — ENCOUNTER SYMPTOMS
ABDOMINAL PAIN: 1
DYSURIA: 0
NAUSEA: 1
DIARRHEA: 1
BLOOD IN STOOL: 0
VOMITING: 1

## 2020-03-23 NOTE — ED AVS SNAPSHOT
Emergency Department  [address] [facility] [address]  Phone:  [phone]  Fax:  [phone]                                    Danna Suarez   MRN: [id number]    Department:   Emergency Department   Date of Visit:  3/23/2020           After Visit Summary Signature Page    I have received my discharge instructions, and my questions have been answered. I have discussed any challenges I see with this plan with the nurse or doctor.    ..........................................................................................................................................  Patient/Patient Representative Signature      ..........................................................................................................................................  Patient Representative Print Name and Relationship to Patient    ..................................................               ................................................  Date                                   Time    ..........................................................................................................................................  Reviewed by Signature/Title    ...................................................              ..............................................  Date                                               Time          22EPIC Rev 08/18

## 2020-03-23 NOTE — ED PROVIDER NOTES
History     Chief Complaint:  Nausea, Vomiting, & Diarrhea      The history is provided by the patient.      Danna Suarez is a 19 year old female who presents with nausea, vomiting, and diarrhea. The patient reports vomiting and diarrhea with associative generalized abdominal pain that began this morning. She is concerned that she may be dehydrated. She also endorses vaginal bleeding as she is on her period. The patient denies urinary issues, hematemesis, and blood in stool. The patient denies chance of pregnancy and has an IUD. She denies recent travel or antibiotic use.     Allergies:  Nsaids  Advil Pm [Ibuprofen-Diphenhydramine Cit]      Medications:    Albuterol     Past Medical History:    Anxiety  Depression   Suicidal ideation  Yeast infection of the vagina   Mono exposure     Past Surgical History:    History reviewed. No pertinent past surgical history.     Family History:    Father - substance abuse, depression  Mother - depression  Brother - depression, anxiety disorder, substance abuse     Social History:  Smoking Status: Former smoker 0.5 PPD for 0.5 years  Smokeless Tobacco: Current user  Alcohol Use: Yes   Drug use: Marijuana, acid x1   Marital Status:  Single     Review of Systems   Gastrointestinal: Positive for abdominal pain (generalized), diarrhea, nausea and vomiting (deneies hematemesis). Negative for blood in stool.   Genitourinary: Negative for dysuria.   All other systems reviewed and are negative.      Physical Exam     Patient Vitals for the past 24 hrs:   BP Temp Temp src Pulse Heart Rate Resp SpO2 Weight   03/23/20 0951 108/89 98  F (36.7  C) Oral 95 95 20 100 % 47.6 kg (105 lb)       Physical Exam  General: Patient is awake, alert and interactive when I enter the room  Head: The scalp, face, and head appear normal  Eyes: The pupils are equal, round, and reactive to light. Conjunctivae and sclerae are normal  ENT: External acoustic canals are normal. The oropharynx is normal  without erythema. Uvula is in the midline. Mucus membranes are moist.  Neck: Normal range of motion. No anterior cervical lymphadenopathy noted  CV: Regular rate. S1/S2. No murmurs.   Resp: Lungs are clear without wheezes or rales. No respiratory distress.   GI: Abdomen is soft, no rigidity, guarding, or rebound. No distension. No tenderness to palpation in any quadrant.     MS: Normal tone. Joints grossly normal without effusions. No asymmetric leg swelling, calf or thigh tenderness.    Skin: No rash or lesions noted. Normal capillary refill noted  Neuro: Speech is normal and fluent. Face is symmetric. Moving all extremities.   Psych:  Normal affect.  Appropriate interactions.    Emergency Department Course     Laboratory:  Laboratory findings were communicated with the patient who voiced understanding of the findings.    UA with microscopic: urineketon 10, blood large, protein albumin urine 10. Leukocyte esterase trace, RBC 33 (H), squamous epithelial 7 (H), mucous present o/w WNL  HCG qualitative: negative     Lipase: 113  CBC: WBC 17.6 (H), HGB 14.6,   CMP: glucose 102 (H) o/w WNL (Creatinine 0.61)    Interventions:  1048 NS bolus 1,000 ml IV  1050 Zofran 4 mg IV  1133 NS bolus 1,000 ml IV    Emergency Department Course:  Nursing notes and vitals reviewed.    1005 I performed an exam of the patient as documented above.     The patient provided a urine sample here in the emergency department. This was sent for laboratory testing, findings above.    IV was inserted and blood was drawn for laboratory testing, results above.    1214 I returned to update the patient, she was able to tolerate water.     Findings and plan explained to the Patient. Patient discharged home with instructions regarding supportive care, medications, and reasons to return. The importance of close follow-up was reviewed. The patient was prescribed zofran.     Impression & Plan      Medical Decision Making:  Danna Suarez is a 19  year old female who presents with acute nausea, vomiting and diarrhea.  On initial evaluation she is hemodynamically stable with normal vital signs.  She is afebrile. She has a benign abdominal exam without focal RLQ or LLQ tenderness to suggest diverticulitis or appendicitis, respectively, or other acute abdominal process. I considered a broad differential diagnosis for this patient including viral gastroenteritis, bacterial infection of the large intestine (salmonella, shigella, campylobacter, e coli, etc), bowel obstruction, intra-abdominal infection such as colitis, food poisoning, cholecystitis, UTI, pyelonephritis, appendicitis, etc.  Symptoms are improved after IV fluids and symptomatic treatment.  The patient is not pregnant. There is no evidence of urinary tract infection. There has been no travel or antibiotic exposure to suggest more concerning cause of diarrhea, and there has been no hematemesis or BRBPR/melena.  Vital signs have remained normal and stable throughout the ED course, and the abdominal re-exam remains benign. I believe she is safe for discharge in improved condition at this time with strict return precautions for recurrent vomiting, pain, fever or any other concerning symptoms.     Diagnosis:    ICD-10-CM    1. Non-intractable vomiting with nausea, unspecified vomiting type  R11.2    2. Diarrhea, unspecified type  R19.7        Disposition:   The patient is discharged to home.    Discharge Medications:  New Prescriptions    ONDANSETRON (ZOFRAN) 4 MG TABLET    Take 1 tablet (4 mg) by mouth every 8 hours as needed for nausea       Scribe Disclosure:  Dayan MELÉNDEZ, am serving as a scribe at 10:11 AM on 3/23/2020 to document services personally performed by Richi Murguia MD based on my observations and the provider's statements to me.    EMERGENCY DEPARTMENT       Richi Murguia MD  03/23/20 1900

## 2021-05-30 VITALS — WEIGHT: 113 LBS | BODY MASS INDEX: 18.8 KG/M2

## 2021-05-30 VITALS — HEIGHT: 65 IN | BODY MASS INDEX: 18.3 KG/M2

## 2021-07-30 PROCEDURE — 96360 HYDRATION IV INFUSION INIT: CPT

## 2021-07-30 PROCEDURE — 99283 EMERGENCY DEPT VISIT LOW MDM: CPT | Mod: 25

## 2021-07-30 PROCEDURE — C9803 HOPD COVID-19 SPEC COLLECT: HCPCS

## 2021-07-30 PROCEDURE — 96361 HYDRATE IV INFUSION ADD-ON: CPT

## 2021-07-31 ENCOUNTER — HOSPITAL ENCOUNTER (EMERGENCY)
Facility: CLINIC | Age: 21
Discharge: HOME | End: 2021-07-31
Attending: EMERGENCY MEDICINE | Admitting: EMERGENCY MEDICINE
Payer: COMMERCIAL

## 2021-07-31 VITALS
TEMPERATURE: 98.5 F | DIASTOLIC BLOOD PRESSURE: 70 MMHG | RESPIRATION RATE: 16 BRPM | HEART RATE: 106 BPM | SYSTOLIC BLOOD PRESSURE: 107 MMHG | OXYGEN SATURATION: 100 %

## 2021-07-31 DIAGNOSIS — R07.0 THROAT PAIN: ICD-10-CM

## 2021-07-31 DIAGNOSIS — F41.1 ANXIETY REACTION: ICD-10-CM

## 2021-07-31 DIAGNOSIS — E86.0 DEHYDRATION: ICD-10-CM

## 2021-07-31 DIAGNOSIS — E87.6 HYPOKALEMIA: ICD-10-CM

## 2021-07-31 LAB
ALBUMIN SERPL-MCNC: 4.1 G/DL (ref 3.4–5)
ALP SERPL-CCNC: 89 U/L (ref 40–150)
ALT SERPL W P-5'-P-CCNC: 74 U/L (ref 0–50)
ANION GAP SERPL CALCULATED.3IONS-SCNC: 4 MMOL/L (ref 3–14)
AST SERPL W P-5'-P-CCNC: 28 U/L (ref 0–45)
BASOPHILS # BLD AUTO: 0 10E3/UL (ref 0–0.2)
BASOPHILS NFR BLD AUTO: 0 %
BILIRUB SERPL-MCNC: 0.5 MG/DL (ref 0.2–1.3)
BUN SERPL-MCNC: 5 MG/DL (ref 7–30)
CALCIUM SERPL-MCNC: 9.5 MG/DL (ref 8.5–10.1)
CHLORIDE BLD-SCNC: 104 MMOL/L (ref 94–109)
CO2 SERPL-SCNC: 30 MMOL/L (ref 20–32)
CREAT SERPL-MCNC: 0.66 MG/DL (ref 0.52–1.04)
EOSINOPHIL # BLD AUTO: 0.2 10E3/UL (ref 0–0.7)
EOSINOPHIL NFR BLD AUTO: 2 %
ERYTHROCYTE [DISTWIDTH] IN BLOOD BY AUTOMATED COUNT: 12.6 % (ref 10–15)
GFR SERPL CREATININE-BSD FRML MDRD: >90 ML/MIN/1.73M2
GLUCOSE BLD-MCNC: 100 MG/DL (ref 70–99)
HCT VFR BLD AUTO: 39.7 % (ref 35–47)
HGB BLD-MCNC: 13 G/DL (ref 11.7–15.7)
IMM GRANULOCYTES # BLD: 0 10E3/UL
IMM GRANULOCYTES NFR BLD: 0 %
LYMPHOCYTES # BLD AUTO: 2.9 10E3/UL (ref 0.8–5.3)
LYMPHOCYTES NFR BLD AUTO: 31 %
MCH RBC QN AUTO: 28.9 PG (ref 26.5–33)
MCHC RBC AUTO-ENTMCNC: 32.7 G/DL (ref 31.5–36.5)
MCV RBC AUTO: 88 FL (ref 78–100)
MONOCYTES # BLD AUTO: 0.6 10E3/UL (ref 0–1.3)
MONOCYTES NFR BLD AUTO: 7 %
NEUTROPHILS # BLD AUTO: 5.8 10E3/UL (ref 1.6–8.3)
NEUTROPHILS NFR BLD AUTO: 60 %
NRBC # BLD AUTO: 0 10E3/UL
NRBC BLD AUTO-RTO: 0 /100
PLATELET # BLD AUTO: 340 10E3/UL (ref 150–450)
POTASSIUM BLD-SCNC: 3.3 MMOL/L (ref 3.4–5.3)
PROT SERPL-MCNC: 8.1 G/DL (ref 6.8–8.8)
RBC # BLD AUTO: 4.5 10E6/UL (ref 3.8–5.2)
SARS-COV-2 RNA RESP QL NAA+PROBE: NEGATIVE
SODIUM SERPL-SCNC: 138 MMOL/L (ref 133–144)
WBC # BLD AUTO: 9.5 10E3/UL (ref 4–11)

## 2021-07-31 PROCEDURE — 258N000003 HC RX IP 258 OP 636: Performed by: EMERGENCY MEDICINE

## 2021-07-31 PROCEDURE — 80053 COMPREHEN METABOLIC PANEL: CPT | Performed by: EMERGENCY MEDICINE

## 2021-07-31 PROCEDURE — 36415 COLL VENOUS BLD VENIPUNCTURE: CPT | Performed by: EMERGENCY MEDICINE

## 2021-07-31 PROCEDURE — 87635 SARS-COV-2 COVID-19 AMP PRB: CPT | Performed by: EMERGENCY MEDICINE

## 2021-07-31 PROCEDURE — 85025 COMPLETE CBC W/AUTO DIFF WBC: CPT | Performed by: EMERGENCY MEDICINE

## 2021-07-31 RX ORDER — POTASSIUM CHLORIDE 1500 MG/1
40 TABLET, EXTENDED RELEASE ORAL ONCE
Status: DISCONTINUED | OUTPATIENT
Start: 2021-07-31 | End: 2021-07-31

## 2021-07-31 RX ORDER — POTASSIUM CHLORIDE 1.5 G/1.58G
20 POWDER, FOR SOLUTION ORAL ONCE
Status: DISCONTINUED | OUTPATIENT
Start: 2021-07-31 | End: 2021-07-31

## 2021-07-31 RX ORDER — POTASSIUM CHLORIDE 1.5 G/1.58G
40 POWDER, FOR SOLUTION ORAL ONCE
Status: DISCONTINUED | OUTPATIENT
Start: 2021-07-31 | End: 2021-07-31 | Stop reason: HOSPADM

## 2021-07-31 RX ADMIN — SODIUM CHLORIDE 1000 ML: 9 INJECTION, SOLUTION INTRAVENOUS at 01:17

## 2021-07-31 ASSESSMENT — ENCOUNTER SYMPTOMS
NECK PAIN: 1
LIGHT-HEADEDNESS: 1
WEAKNESS: 1
FATIGUE: 1

## 2021-07-31 NOTE — DISCHARGE INSTRUCTIONS
See your psychiatrist to discuss your medication regimen.     Come back to the ER for increased pain, thoughts of hurting yourself, inability to eat/drink, fevers, increased weakness or other concerns.    Follow up with your ENT doctor as scheduled.    Drink lots of fluids.

## 2021-07-31 NOTE — ED PROVIDER NOTES
"  History   Chief Complaint:  Psychiatric Evaluation       The history is provided by the patient and a relative.      Danna Suarez is a 21 year old female with history of psychosis, anxiety, depression, and PTSD who presents for psychiatric evaluation. The patient states that tonight when going to bed, she felt she could not move. She reports having had sleep paralysis in the past, but tonight it persisted, and did not resolve quickly as it normally does. She also believes she is dehydrated, as earlier today she felt very hot and thirsty while cleaning her bedroom. The patient also reports light headedness, neck pain, and extreme fatigue here in the ED. She notes that she had a tonsillectomy 8 days ago and has been eating less due to this recent procedure.     The patient's mother reports that the patient has had syncopal episodes recently. The patient was admitted to a care facility about one year ago for evaluation of psychosis, in the setting of suicide attempt, and began taking psychiatric medications after that. A few months ago after feeling better, the patient decided to stop taking her medications. Beginning about 2 weeks ago, the patient felt that she needed to continue taking her medications, and her dosage of Seroquel was increased.  She was afraid tonight, that her episode of \"sleep paralysis\" was assigned she was once again becoming psychotic.  She denies feeling suicidal or homicidal.      Review of Systems   Constitutional: Positive for fatigue.   Musculoskeletal: Positive for neck pain.   Neurological: Positive for syncope, weakness and light-headedness.   All other systems reviewed and are negative.      Allergies:  Mushroom  Nsaids  Advil Pm    Medications:  Albuterol  Ondansetron  Epinephrine  Oxycodone  Seroquel    Past Medical History:    Anxiety  Depression  Suicidal ideation  UTI  Yeast infection  PTSD  Bipolar 2 disorder  Psychosis  Peritonsillar abscess  Cannabis use disorder  Acute " hypokalemia  IUD  Mood disorder    Past Surgical History:    Facial fracture surgery  Tonsillectomy    Family History:    Birth Mother: depression  Birth Father: substance abuse, depression  Brother: depression, anxiety disorder, substance abuse    Social History:  The patient presents to the ED with her mother    Physical Exam     Patient Vitals for the past 24 hrs:   BP Temp Temp src Pulse Resp SpO2   07/31/21 0121 107/70 -- -- 106 16 100 %   07/31/21 0000 115/68 98.5  F (36.9  C) Temporal (!) 166 18 100 %       Physical Exam    Constitutional:  Cooperative. Looks uncomfortable and sleepy, but answers questions appropriately.   HENT:   Head:    Atraumatic.   Mouth/Throat:   Oropharynx has white adherent exudate on both tonsil beds, more on the left than right. No significant erythema or swelling. No bleeding  Eyes:    Conjunctivae normal and EOM are normal.      Pupils are equal, round, and reactive to light.   Neck:    Normal range of motion. Neck supple. No lymphadenopathy, no nuchal rigidity.  Cardiovascular:  Mildly tachycardic  Pulmonary/Chest:  Effort normal and breath sounds normal.   Abdominal:   Soft. Bowel sounds are normal.      No splenomegaly or hepatomegaly. No tenderness. No rebound.   Musculoskeletal:  Normal range of motion. No edema and no tenderness.   Neurological:  Alert. Normal strength. No cranial nerve deficit.  Skin:    Skin is warm and dry. Pale.   Psychiatric:   Somnolent, appears sedated, depressed mood. Flat affect. Limited eye contact.      Emergency Department Course     Laboratory:    CMP: Potassium: 3.3 (L), Urea: 5 (L), Glucose: 100 (H), ALT: 74 (H) o/w WNL (Creatinine 0.66)     CBC: WBC 9.5, HGB 13.0,     Asymptomatic COVID-19 virus PCR: Negative    Emergency Department Course:    Reviewed:  I reviewed nursing notes, vitals, past medical history and care everywhere    Assessments:  0054 I obtained history and examined the patient as noted above.    0334 I rechecked the  patient and explained findings. We discussed discharge and the patient is comfortable returning home.    Interventions:  0117 0.9% sodium chloride 1000 mL IV    Disposition:  The patient was discharged to home.       Impression & Plan     Medical Decision Making:    Patient presents complaining of feeling very anxious, lightheaded, dizzy, and short of breath. She had a spell where she felt like she couldn't move. At triage she reported this was similar to feelings she had when she was suicidal and psychotic a couple of years ago.    On further questioning she reports she hasn't been eating or drinking well in the 8 days since her tonsillectomy. She describes throat pain that makes swallowing anything challenging. She has not had fever, chills, or any signs of infection. There has been no bleeding. On exam, she seems anxious, somnolent, and dehydrated. Oropharynx looks like a normal post op throat, without signs of developing abscess or other acute abnormality.     Because of the signs of dehydration and her tachycardia, an IV was established and she was given IV fluids. Electrolytes were checked and her potassium was borderline low. This was supplemented orally. Tachycardia at triage seemed to be worsened by anxiety and hyperventilation. By the time I saw her, her heart rate was in the 100's. This normalized after fluids. CBC and differential look unremarkable.    Patient reported feeling somewhat improved after fluids. She also reported feeling frustrated and angry that she had been placed in a mental health room rather than a normal hospital bed. She was concerned that we hadn't taken her seriously or done any serious evaluation. I explained the testing and treatment that we performed, and that I thought she was medically safe. We previously discussed transfer to [facility] to talk about her anxiety and medication changes. The patient reports she does not think she needs [facility]. She would rather folllow-up with her  psychiatrist. She reports that she does not think she is in danger. The spell of immobility has passed.    The patients mother feels that the patient would be safe at home and she lives with her daughter. They will return for signs or symptoms of worsening dehydration, signs of infection, inability to eat or swallow, recurrent bouts of weakness, or for worsening mental health symptoms including suicidal ideation. She was discharged in good condition.      Covid-19  Danna Suarez was evaluated during a global COVID-19 pandemic, which necessitated consideration that the patient might be at risk for infection with the SARS-CoV-2 virus that causes COVID-19.   Applicable protocols for evaluation were followed during the patient's care.   COVID-19 was considered as part of the patient's evaluation. The plan for testing is:  a test was obtained during this visit.    Diagnosis:    ICD-10-CM    1. Dehydration  E86.0    2. Hypokalemia  E87.6    3. Throat pain  R07.0    4. Anxiety reaction  F41.1        Discharge Medications:  Discharge Medication List as of 7/31/2021  3:37 AM          Scribe Disclosure:  I, Torres Han, am serving as a scribe at 1:01 AM on 7/31/2021 to document services personally performed by Kobe Blair MD based on my observations and the provider's statements to me.              Kobe Blair MD  07/31/21 3631

## 2021-07-31 NOTE — ED NOTES
"Pt asked several times if I can chart that she was not in the mental health section of the emergency room, she was very upset about the potassium drink she was given reporting that it burned her throat.  Pt states \"I'm calm now\".  "

## 2021-07-31 NOTE — ED TRIAGE NOTES
"Overdose on Tylenol a couple of years ago, \"had a near-death experience\", pt's mother describes feeling the same way she did back then. Pt is very tearful and anxious states \"I dissociate!\"  Tachycardic. Denies SI or HI.  "